# Patient Record
Sex: MALE | Race: BLACK OR AFRICAN AMERICAN | Employment: OTHER | URBAN - METROPOLITAN AREA
[De-identification: names, ages, dates, MRNs, and addresses within clinical notes are randomized per-mention and may not be internally consistent; named-entity substitution may affect disease eponyms.]

---

## 2024-05-06 DIAGNOSIS — Z01.818 OTHER SPECIFIED PRE-OPERATIVE EXAMINATION: Primary | ICD-10-CM

## 2024-05-06 DIAGNOSIS — N18.6 END STAGE RENAL DISEASE: ICD-10-CM

## 2024-05-06 DIAGNOSIS — N18.4 CHRONIC KIDNEY DISEASE, STAGE IV (SEVERE): Primary | ICD-10-CM

## 2024-06-06 RX ORDER — CARVEDILOL 25 MG/1
25 TABLET ORAL 2 TIMES DAILY
COMMUNITY
Start: 2024-02-07

## 2024-06-06 RX ORDER — CALCITRIOL 0.25 UG/1
0.25 CAPSULE ORAL
COMMUNITY
Start: 2024-02-06 | End: 2024-06-18

## 2024-06-06 RX ORDER — FUROSEMIDE 40 MG/1
TABLET ORAL
COMMUNITY
Start: 2024-01-09

## 2024-06-06 RX ORDER — DOXAZOSIN 8 MG/1
TABLET ORAL
COMMUNITY
Start: 2024-02-06

## 2024-06-06 RX ORDER — METOLAZONE 2.5 MG/1
TABLET ORAL
COMMUNITY
Start: 2023-12-16

## 2024-06-06 RX ORDER — FLUTICASONE PROPIONATE 50 MCG
2 SPRAY, SUSPENSION (ML) NASAL
COMMUNITY
Start: 2024-02-26 | End: 2024-06-18

## 2024-06-06 RX ORDER — GABAPENTIN 300 MG/1
300 CAPSULE ORAL 3 TIMES DAILY
COMMUNITY
Start: 2024-01-22 | End: 2024-06-18

## 2024-06-06 RX ORDER — AZELASTINE 1 MG/ML
1 SPRAY, METERED NASAL 2 TIMES DAILY
COMMUNITY
Start: 2024-02-27

## 2024-06-06 RX ORDER — TORSEMIDE 20 MG/1
40 TABLET ORAL 2 TIMES DAILY
COMMUNITY
Start: 2024-03-27 | End: 2024-06-18

## 2024-06-06 RX ORDER — ERYTHROPOIETIN 20000 [IU]/ML
INJECTION, SOLUTION INTRAVENOUS; SUBCUTANEOUS
COMMUNITY
Start: 2024-04-24 | End: 2024-06-18

## 2024-06-06 RX ORDER — METHADONE HYDROCHLORIDE 10 MG/1
10 TABLET ORAL 4 TIMES DAILY
COMMUNITY
Start: 2024-01-10 | End: 2024-06-18

## 2024-06-06 RX ORDER — ATORVASTATIN CALCIUM 40 MG/1
40 TABLET, FILM COATED ORAL
COMMUNITY
Start: 2024-02-07

## 2024-06-06 RX ORDER — HYDRALAZINE HYDROCHLORIDE 50 MG/1
50 TABLET, FILM COATED ORAL 2 TIMES DAILY
COMMUNITY
Start: 2024-04-23

## 2024-06-06 RX ORDER — HYDROCODONE BITARTRATE AND ACETAMINOPHEN 10; 325 MG/1; MG/1
1 TABLET ORAL 4 TIMES DAILY PRN
COMMUNITY
End: 2024-06-18

## 2024-06-06 RX ORDER — TIZANIDINE 4 MG/1
4 TABLET ORAL NIGHTLY
COMMUNITY
Start: 2024-02-07 | End: 2024-06-18

## 2024-06-06 RX ORDER — CALCITRIOL 0.5 UG/1
0.5 CAPSULE ORAL
COMMUNITY
Start: 2024-03-27

## 2024-06-17 NOTE — PROGRESS NOTES
"      [facility] - Clinic Note  Mesfin Dasilva MD      Patient Name: Rodrigo Tinsley Jr.                   : 1955     MRN: [id number]   Visit Date: 2024          History Present Illness     Reason for Visit: Dialysis Access Evaluation    Mr. Tinsley presents to the clinic in need of permanent hemodialysis access creation. He is being referred by her nephrolgoist Dr. Valle. He is not on hemodialysis at this time. Most recent GFR is 14. Vein mapping of his left arm obtained today as he is right handed. He denies pacemaker/ICD or history of mediport.     He reports history of cardiac stents. He is in need to aortic valve replacement but it has been determined for his to weight unless after access creation and need to initiate hemodialysis. He is able to walk approximately 50 yards on a good day before onset of shortness of breath. He notes he has gained weight due to increased fluid.      REVIEW OF SYSTEMS:  12 point review of systems conducted, negative except as stated in the history of present illness. See HPI for details.        Physical Exam      Vitals:    24 1053 24 1054   BP: (!) 160/66 (!) 161/72   BP Location: Right arm Left arm   Pulse: 74 72   Weight: 122.5 kg (270 lb)    Height: 5' 7" (1.702 m)         General: well-nourished, no acute distress, and healthy appearing, ambulating normally, alert, pleasant, conversant, and oriented  Neurologic: cranial nerves are grossly intact, no neurologic deficits, no motor deficits, and no sensory deficits  HEENT: grossly normal and no scleral icterus  Neck/Chest: normal  and soft without lymphadenopathy  Respiratory: breathing easily and without respiratory distress  Abdomen: normal and soft  Cardiology: regular rate and rhythm    Upper Extremity Arterial Exam:   Right - radial is palpable  Left - radial is palpable    Dialysis Access:  none    Musculoskeletal:   Upper Extremity: normal bilateral hand function, right hand is warm and " left hand is warm  Lower Extremity: no edema present to bilateral lower extremities              Assessment and Plan     Mr. Tinsley is a 69 y.o. with chronic kidney disease who needs permanent access creation. He is not currently on dialysis, however most recent GFR is 14. Vein mapping obtained today demonstrates anatomy suitable for a LEFT ARM ACCESS CREATION- LIKELY BRACHIOCEPHALIC FISTULA CREATION VERSUS BASILIC TRANSPOSITION.  We discussed the risks and benefits of surgery at length including the risks of bleeding, infection, failure of access to mature, need for further procedures, neurovascular injury, and/or steal syndrome. He wishes to proceed.   Has known cardiac issues and will request records/clearance from cardiologist.  On chronic opiates - no new pain script sent.         1. Chronic kidney disease, stage IV (severe)  - Ambulatory referral/consult to Vascular Surgery  - Basic Metabolic Panel; Future  - CBC Auto Differential; Future  - EKG 12-lead; Future  - X-Ray Chest PA And Lateral Pre-OP; Future           Imaging Obtained/Reviewed   Study: left upper extremity vein mapping  Date:   6/18/24           Medical History     Past Medical History:   Diagnosis Date    Anemia     Arthritis     Asymptomatic PVD (peripheral vascular disease)     stenting of BLE - Dr. Drew    CAD (coronary artery disease)     CKD (chronic kidney disease), stage III     Diabetes     GERD (gastroesophageal reflux disease)     HTN (hypertension)     Hyperkalemia     Lymphedema     Morbid obesity     STAN (obstructive sleep apnea)     on CPAP    Pneumonia 2023    Sinusitis      Past Surgical History:   Procedure Laterality Date    DISC REMOVAL      C5 and C7    FEMORAL ARTERY STENT  2013    GASTRIC BYPASS      PERCUTANEOUS CORONARY ARTERY INTERVENTION      REVISION OF KNEE ARTHROPLASTY Left     SINUS SURGERY      TOTAL KNEE ARTHROPLASTY Left      Family History   Problem Relation Name Age of Onset    Hypertension Mother       Diabetes Mother      Hypertension Father      Diabetes Father       Social History     Socioeconomic History    Marital status: Single   Tobacco Use    Smoking status: Former     Current packs/day: 0.00     Types: Cigarettes     Quit date:      Years since quitting: 15.4    Smokeless tobacco: Former     Types: Chew     Quit date: 2009     Current Outpatient Medications   Medication Instructions    aspirin 81 MG Chew 1 tablet, Oral, Daily    atorvastatin (LIPITOR) 40 mg, Oral    azelastine (ASTELIN) 137 mcg (0.1 %) nasal spray 1 spray, 2 times daily    calcitRIOL (ROCALTROL) 0.5 mcg, Oral    carvediloL (COREG) 25 mg, Oral, 2 times daily    doxazosin (CARDURA) 8 MG Tab Oral    furosemide (LASIX) 40 MG tablet SMARTSI Tablet(s) By Mouth Morning-Evening PRN    hydrALAZINE (APRESOLINE) 50 mg, Oral, 2 times daily    metOLazone (ZAROXOLYN) 2.5 MG tablet Oral     Review of patient's allergies indicates:   Allergen Reactions    Nifedipine Swelling    Verapamil Other (See Comments)     Swelling of knee       Patient Care Team:  Thor London MD (Inactive) as PCP - General (Family Medicine)  Keily Valle MD (Nephrology)  Earle Orlando MD as Consulting Physician (Cardiothoracic Surgery)  Barrie Drew MD (Cardiology)      No follow-ups on file. In addition to their scheduled follow up, the patient has also been instructed to follow up on as needed basis.     No future appointments.

## 2024-06-18 ENCOUNTER — OFFICE VISIT (OUTPATIENT)
Dept: VASCULAR SURGERY | Facility: CLINIC | Age: 69
End: 2024-06-18
Payer: MEDICARE

## 2024-06-18 VITALS
HEIGHT: 67 IN | HEART RATE: 72 BPM | WEIGHT: 270 LBS | DIASTOLIC BLOOD PRESSURE: 72 MMHG | SYSTOLIC BLOOD PRESSURE: 161 MMHG | BODY MASS INDEX: 42.38 KG/M2

## 2024-06-18 DIAGNOSIS — N18.4 CHRONIC KIDNEY DISEASE, STAGE IV (SEVERE): ICD-10-CM

## 2024-06-18 DIAGNOSIS — N18.4 CHRONIC KIDNEY DISEASE, STAGE IV (SEVERE): Primary | ICD-10-CM

## 2024-06-18 PROCEDURE — 99204 OFFICE O/P NEW MOD 45 MIN: CPT | Mod: ,,, | Performed by: SPECIALIST

## 2024-06-18 RX ORDER — NAPROXEN SODIUM 220 MG/1
1 TABLET, FILM COATED ORAL DAILY
COMMUNITY

## 2024-06-18 RX ORDER — HYDROCODONE BITARTRATE AND ACETAMINOPHEN 7.5; 325 MG/1; MG/1
1 TABLET ORAL EVERY 6 HOURS PRN
Qty: 28 TABLET | Refills: 0 | Status: CANCELLED | OUTPATIENT
Start: 2024-06-18

## 2024-06-18 RX ORDER — SODIUM CHLORIDE 9 MG/ML
INJECTION, SOLUTION INTRAVENOUS CONTINUOUS
OUTPATIENT
Start: 2024-07-01

## 2024-06-18 NOTE — LETTER
[facility]                    Medication Clearance Form    PLEASE PROVIDE ALL INFORMATION      Date : 2024       Please provide us with WRITTEN Medication Clearance:  Mr. Rodrigo Tinsley Jr.    : 1955       Please send most recent clinic note, EKG, echocardiogram, and/or additional cardiac testing.      He will be scheduled for a HEMODIALYSIS ACCESS CREATION  under Regional Nerve Block scheduled on  2024.      PLEASE PROVIDE CARDIAC CLEARANCE TO PROCEED WITH SURGERY.      Thank you for your help in this matter.    Sincerely,        Mesfin Dasilva MD  [facility]  Phone: [phone]  Fax: [phone]

## 2024-06-27 ENCOUNTER — TELEPHONE (OUTPATIENT)
Dept: VASCULAR SURGERY | Facility: CLINIC | Age: 69
End: 2024-06-27
Payer: MEDICARE

## 2024-06-27 NOTE — TELEPHONE ENCOUNTER
Patients potassium level was 5.4 from 6/20/2024 on preop testing for surgery with Dr. Dasilva. Spoke with Dr. Valle's nurse Jennifer who states she will review with Dr. Valle. Patient is CKD.

## 2024-06-28 ENCOUNTER — TELEPHONE (OUTPATIENT)
Dept: VASCULAR SURGERY | Facility: CLINIC | Age: 69
End: 2024-06-28
Payer: MEDICARE

## 2024-06-28 NOTE — TELEPHONE ENCOUNTER
Spoke with William at Dr. Drew's office who states that patient will need to be seen prior to cardiac clearance begin given. Patient is scheduled on 7/15/2024 at 1:15 pm. I rescheduled his surgery to 7/17/2024 to keep him within date of labs and H&P. Patients wife verbalized understanding of all instructions.

## 2024-07-17 ENCOUNTER — TELEPHONE (OUTPATIENT)
Dept: VASCULAR SURGERY | Facility: CLINIC | Age: 69
End: 2024-07-17
Payer: MEDICARE

## 2024-07-17 NOTE — TELEPHONE ENCOUNTER
Late entry (7/16/2024)- Patient had echocardiogram performed on 7/16/2024 based off of those results his cardiologist recommended that he needed a heart cath. Per Dr. Drew patient's L-V function was abnormal. Cancelled patients surgery for 7/17/2024. Patient is scheduled for H&P update.Determination of where or not his H&P update will need to be moved out will be based off of heart cath results.

## 2024-08-05 NOTE — PROGRESS NOTES
"      [facility] - Clinic Note  Mesfin Dasilva MD      Patient Name: Rodrigo Tinsley Jr.                   : 1955     MRN: [id number]   Visit Date: 2024          History Present Illness     Reason for Visit: History and Physical Update    Mr. Tinsley presents to the clinic for history and physical update in preparation for left brachiocephalic fistula. Surgery was postponed due to need for cardiac clearance. He has been cleared to proceed (records in media). He denies significant changes to his medical history.     He completed his cardiac clearance and did undergo coronary angiography.  No significant concerns were identified.  No other recent issues.       REVIEW OF SYSTEMS:  ROS  12 point review of systems conducted, negative except as stated in the history of present illness. See HPI for details.        Physical Exam      Vitals:    24 0924 24 0925   BP: (!) 166/71 (!) 144/77   BP Location: Left arm Right arm   Pulse: 66 73   Weight: 123.8 kg (273 lb)    Height: 5' 7" (1.702 m)         General: well-nourished, no acute distress, and healthy appearing, ambulating normally, alert, pleasant, conversant, and oriented  Neurologic: cranial nerves are grossly intact, no neurologic deficits, no motor deficits, and no sensory deficits  HEENT: grossly normal and no scleral icterus  Neck/Chest: normal  and soft without lymphadenopathy  Respiratory: breathing easily and without respiratory distress  Abdomen: normal and soft  Cardiology: regular rate and rhythm    Upper Extremity Arterial Exam:   Right - radial is palpable  Left - radial is palpable    Dialysis Access:  none    Musculoskeletal:   Upper Extremity: normal bilateral hand function,right hand is warm and left hand is warm  Lower Extremity: trace edema present to bilateral lower extremities            Assessment and Plan     Mr. Tinsley is a 69 y.o.  with chronic kidney disease who needs permanent access creation. He is not " currently on dialysis, however most recent GFR is 11. Vein mapping obtained 6/18/24 demonstrates anatomy suitable for a LEFT ARM ACCESS CREATION- LIKELY BRACHIOCEPHALIC FISTULA CREATION VERSUS BASILIC TRANSPOSITION.  We reviewed the risks and benefits of surgery at length including the risks of bleeding, infection, failure of access to mature, need for further procedures, neurovascular injury, and/or steal syndrome. He has been evaluated by cardiology and cleared for surgery. He wishes to proceed.        On chronic opiates - no new pain script sent.       1. CKD (chronic kidney disease), stage V  - Basic Metabolic Panel; Future  - CBC Auto Differential; Future  - EKG 12-lead; Future  - X-Ray Chest PA And Lateral Pre-OP; Future           Imaging Obtained/Reviewed   Study: left upper extremity vein mapping  Date:   6/18/24           Medical History     Past Medical History:   Diagnosis Date    Anemia     Arthritis     Asymptomatic PVD (peripheral vascular disease)     stenting of BLE - Dr. Drew    Balance problem     CAD (coronary artery disease)     Chronic kidney disease, stage IV (severe)     Diabetes     GERD (gastroesophageal reflux disease)     HTN (hypertension)     Hyperkalemia     Left knee pain     Lymphedema     Morbid obesity     Obesity     STAN (obstructive sleep apnea)     on CPAP    Personal history of fall     Pneumonia 2023    Shortness of breath     Sinusitis      Past Surgical History:   Procedure Laterality Date    CERVICAL DISC SURGERY      COLONOSCOPY      CORONARY STENT PLACEMENT      DISC REMOVAL      C5 and C7    FEMORAL ARTERY STENT  2013    GASTRIC BYPASS      PERCUTANEOUS CORONARY ARTERY INTERVENTION      REVISION OF KNEE ARTHROPLASTY Left     SINUS SURGERY      TOTAL KNEE ARTHROPLASTY Left      Family History   Problem Relation Name Age of Onset    Hypertension Mother      Diabetes Mother      Hypertension Father      Diabetes Father       Social History     Socioeconomic History     Marital status: Single   Tobacco Use    Smoking status: Former     Current packs/day: 0.00     Types: Cigarettes     Quit date: 2009     Years since quitting: 15.6    Smokeless tobacco: Former     Types: Chew     Quit date: 03/2009   Substance and Sexual Activity    Alcohol use: Never    Drug use: Never     Current Outpatient Medications   Medication Instructions    aspirin 81 MG Chew 1 tablet, Oral, Daily    atorvastatin (LIPITOR) 40 mg, Oral, Daily    azelastine (ASTELIN) 137 mcg (0.1 %) nasal spray 1 spray, 2 times daily    calcitRIOL (ROCALTROL) 0.5 mcg, Oral, Daily    carvediloL (COREG) 25 mg, Oral, 2 times daily    doxazosin (CARDURA) 8 mg, Oral, Every 12 hours    furosemide (LASIX) 80 mg, Oral, Daily    hydrALAZINE (APRESOLINE) 50 mg, Oral, 2 times daily    metOLazone (ZAROXOLYN) 2.5 mg, Oral, As needed (PRN)     Review of patient's allergies indicates:   Allergen Reactions    Nifedipine Swelling    Verapamil Other (See Comments)     Swelling of knee       Patient Care Team:  Thor London MD (Inactive) as PCP - General (Family Medicine)  Keily Valle MD (Nephrology)  Earle Orlando MD as Consulting Physician (Cardiothoracic Surgery)  Barrie Drew MD (Cardiology)  [facility] ([facility])      No follow-ups on file. In addition to their scheduled follow up, the patient has also been instructed to follow up on as needed basis.     No future appointments.

## 2024-08-05 NOTE — H&P (VIEW-ONLY)
"      [facility] - Clinic Note  Mesfin Dasilva MD      Patient Name: Rodrigo Tinsley Jr.                   : 1955     MRN: [id number]   Visit Date: 2024          History Present Illness     Reason for Visit: History and Physical Update    Mr. Tinsley presents to the clinic for history and physical update in preparation for left brachiocephalic fistula. Surgery was postponed due to need for cardiac clearance. He has been cleared to proceed (records in media). He denies significant changes to his medical history.     He completed his cardiac clearance and did undergo coronary angiography.  No significant concerns were identified.  No other recent issues.       REVIEW OF SYSTEMS:  ROS  12 point review of systems conducted, negative except as stated in the history of present illness. See HPI for details.        Physical Exam      Vitals:    24 0924 24 0925   BP: (!) 166/71 (!) 144/77   BP Location: Left arm Right arm   Pulse: 66 73   Weight: 123.8 kg (273 lb)    Height: 5' 7" (1.702 m)         General: well-nourished, no acute distress, and healthy appearing, ambulating normally, alert, pleasant, conversant, and oriented  Neurologic: cranial nerves are grossly intact, no neurologic deficits, no motor deficits, and no sensory deficits  HEENT: grossly normal and no scleral icterus  Neck/Chest: normal  and soft without lymphadenopathy  Respiratory: breathing easily and without respiratory distress  Abdomen: normal and soft  Cardiology: regular rate and rhythm    Upper Extremity Arterial Exam:   Right - radial is palpable  Left - radial is palpable    Dialysis Access:  none    Musculoskeletal:   Upper Extremity: normal bilateral hand function,right hand is warm and left hand is warm  Lower Extremity: trace edema present to bilateral lower extremities            Assessment and Plan     Mr. Tinsley is a 69 y.o.  with chronic kidney disease who needs permanent access creation. He is not " currently on dialysis, however most recent GFR is 11. Vein mapping obtained 6/18/24 demonstrates anatomy suitable for a LEFT ARM ACCESS CREATION- LIKELY BRACHIOCEPHALIC FISTULA CREATION VERSUS BASILIC TRANSPOSITION.  We reviewed the risks and benefits of surgery at length including the risks of bleeding, infection, failure of access to mature, need for further procedures, neurovascular injury, and/or steal syndrome. He has been evaluated by cardiology and cleared for surgery. He wishes to proceed.        On chronic opiates - no new pain script sent.       1. CKD (chronic kidney disease), stage V  - Basic Metabolic Panel; Future  - CBC Auto Differential; Future  - EKG 12-lead; Future  - X-Ray Chest PA And Lateral Pre-OP; Future           Imaging Obtained/Reviewed   Study: left upper extremity vein mapping  Date:   6/18/24           Medical History     Past Medical History:   Diagnosis Date    Anemia     Arthritis     Asymptomatic PVD (peripheral vascular disease)     stenting of BLE - Dr. Drew    Balance problem     CAD (coronary artery disease)     Chronic kidney disease, stage IV (severe)     Diabetes     GERD (gastroesophageal reflux disease)     HTN (hypertension)     Hyperkalemia     Left knee pain     Lymphedema     Morbid obesity     Obesity     STAN (obstructive sleep apnea)     on CPAP    Personal history of fall     Pneumonia 2023    Shortness of breath     Sinusitis      Past Surgical History:   Procedure Laterality Date    CERVICAL DISC SURGERY      COLONOSCOPY      CORONARY STENT PLACEMENT      DISC REMOVAL      C5 and C7    FEMORAL ARTERY STENT  2013    GASTRIC BYPASS      PERCUTANEOUS CORONARY ARTERY INTERVENTION      REVISION OF KNEE ARTHROPLASTY Left     SINUS SURGERY      TOTAL KNEE ARTHROPLASTY Left      Family History   Problem Relation Name Age of Onset    Hypertension Mother      Diabetes Mother      Hypertension Father      Diabetes Father       Social History     Socioeconomic History     Marital status: Single   Tobacco Use    Smoking status: Former     Current packs/day: 0.00     Types: Cigarettes     Quit date: 2009     Years since quitting: 15.6    Smokeless tobacco: Former     Types: Chew     Quit date: 03/2009   Substance and Sexual Activity    Alcohol use: Never    Drug use: Never     Current Outpatient Medications   Medication Instructions    aspirin 81 MG Chew 1 tablet, Oral, Daily    atorvastatin (LIPITOR) 40 mg, Oral, Daily    azelastine (ASTELIN) 137 mcg (0.1 %) nasal spray 1 spray, 2 times daily    calcitRIOL (ROCALTROL) 0.5 mcg, Oral, Daily    carvediloL (COREG) 25 mg, Oral, 2 times daily    doxazosin (CARDURA) 8 mg, Oral, Every 12 hours    furosemide (LASIX) 80 mg, Oral, Daily    hydrALAZINE (APRESOLINE) 50 mg, Oral, 2 times daily    metOLazone (ZAROXOLYN) 2.5 mg, Oral, As needed (PRN)     Review of patient's allergies indicates:   Allergen Reactions    Nifedipine Swelling    Verapamil Other (See Comments)     Swelling of knee       Patient Care Team:  Thor London MD (Inactive) as PCP - General (Family Medicine)  Keily Valle MD (Nephrology)  Earle Orlando MD as Consulting Physician (Cardiothoracic Surgery)  Barrie Drew MD (Cardiology)  [facility] ([facility])      No follow-ups on file. In addition to their scheduled follow up, the patient has also been instructed to follow up on as needed basis.     No future appointments.

## 2024-08-13 ENCOUNTER — OFFICE VISIT (OUTPATIENT)
Dept: VASCULAR SURGERY | Facility: CLINIC | Age: 69
End: 2024-08-13
Payer: MEDICARE

## 2024-08-13 VITALS
DIASTOLIC BLOOD PRESSURE: 77 MMHG | WEIGHT: 273 LBS | SYSTOLIC BLOOD PRESSURE: 144 MMHG | BODY MASS INDEX: 42.85 KG/M2 | HEIGHT: 67 IN | HEART RATE: 73 BPM

## 2024-08-13 DIAGNOSIS — N18.5 CKD (CHRONIC KIDNEY DISEASE), STAGE V: Primary | ICD-10-CM

## 2024-08-13 DIAGNOSIS — N18.6 END STAGE RENAL DISEASE: Primary | ICD-10-CM

## 2024-08-13 PROCEDURE — 99213 OFFICE O/P EST LOW 20 MIN: CPT | Mod: ,,, | Performed by: SPECIALIST

## 2024-08-13 RX ORDER — SODIUM CHLORIDE 9 MG/ML
INJECTION, SOLUTION INTRAVENOUS CONTINUOUS
OUTPATIENT
Start: 2024-08-13

## 2024-08-23 ENCOUNTER — ANESTHESIA EVENT (OUTPATIENT)
Dept: SURGERY | Facility: HOSPITAL | Age: 69
End: 2024-08-23
Payer: MEDICARE

## 2024-08-23 RX ORDER — HYDROCODONE BITARTRATE AND ACETAMINOPHEN 10; 325 MG/1; MG/1
1 TABLET ORAL
COMMUNITY
Start: 2024-07-05

## 2024-08-23 RX ORDER — FLUTICASONE PROPIONATE 50 MCG
1 SPRAY, SUSPENSION (ML) NASAL 2 TIMES DAILY PRN
COMMUNITY

## 2024-08-23 RX ORDER — GABAPENTIN 300 MG/1
1 CAPSULE ORAL 3 TIMES DAILY PRN
COMMUNITY

## 2024-08-23 RX ORDER — PATIROMER 8.4 G/1
16.8 POWDER, FOR SUSPENSION ORAL 2 TIMES DAILY
COMMUNITY
Start: 2024-06-28

## 2024-08-23 NOTE — CLINICAL REVIEW
labs/EKG reviewed. CXR (6/20/24) reviewed. cardiology notes utd. Echo/Angiogram noted. Cardiac Clearance noted. chart review complete. ccv

## 2024-09-03 ENCOUNTER — TELEPHONE (OUTPATIENT)
Dept: VASCULAR SURGERY | Facility: CLINIC | Age: 69
End: 2024-09-03

## 2024-09-04 ENCOUNTER — ANESTHESIA (OUTPATIENT)
Dept: SURGERY | Facility: HOSPITAL | Age: 69
End: 2024-09-04
Payer: MEDICARE

## 2024-09-04 ENCOUNTER — HOSPITAL ENCOUNTER (OUTPATIENT)
Facility: HOSPITAL | Age: 69
Discharge: HOME | End: 2024-09-04
Attending: SPECIALIST | Admitting: SPECIALIST
Payer: MEDICARE

## 2024-09-04 VITALS
HEIGHT: 67 IN | WEIGHT: 268 LBS | OXYGEN SATURATION: 96 % | SYSTOLIC BLOOD PRESSURE: 113 MMHG | HEART RATE: 87 BPM | BODY MASS INDEX: 42.06 KG/M2 | DIASTOLIC BLOOD PRESSURE: 68 MMHG | RESPIRATION RATE: 20 BRPM | TEMPERATURE: 98 F

## 2024-09-04 DIAGNOSIS — N18.6 END STAGE RENAL DISEASE: ICD-10-CM

## 2024-09-04 LAB
ANION GAP SERPL CALC-SCNC: 7 MEQ/L
BUN SERPL-MCNC: 23.6 MG/DL (ref 8.4–25.7)
CALCIUM SERPL-MCNC: 8.3 MG/DL (ref 8.8–10)
CHLORIDE SERPL-SCNC: 97 MMOL/L (ref 98–107)
CO2 SERPL-SCNC: 31 MMOL/L (ref 23–31)
CREAT SERPL-MCNC: 4.08 MG/DL (ref 0.73–1.18)
CREAT/UREA NIT SERPL: 6
GFR SERPLBLD CREATININE-BSD FMLA CKD-EPI: 15 ML/MIN/1.73/M2
GLUCOSE SERPL-MCNC: 89 MG/DL (ref 82–115)
POCT GLUCOSE: 91 MG/DL (ref 70–110)
POTASSIUM SERPL-SCNC: 4.3 MMOL/L (ref 3.5–5.1)
SODIUM SERPL-SCNC: 135 MMOL/L (ref 136–145)

## 2024-09-04 PROCEDURE — 63600175 PHARM REV CODE 636 W HCPCS

## 2024-09-04 PROCEDURE — 63600175 PHARM REV CODE 636 W HCPCS: Performed by: NURSE ANESTHETIST, CERTIFIED REGISTERED

## 2024-09-04 PROCEDURE — 80048 BASIC METABOLIC PNL TOTAL CA: CPT | Performed by: SPECIALIST

## 2024-09-04 PROCEDURE — 63600175 PHARM REV CODE 636 W HCPCS: Performed by: SPECIALIST

## 2024-09-04 PROCEDURE — 37000008 HC ANESTHESIA 1ST 15 MINUTES: Performed by: SPECIALIST

## 2024-09-04 PROCEDURE — 82962 GLUCOSE BLOOD TEST: CPT | Performed by: SPECIALIST

## 2024-09-04 PROCEDURE — 63600175 PHARM REV CODE 636 W HCPCS: Performed by: ANESTHESIOLOGY

## 2024-09-04 PROCEDURE — 25000003 PHARM REV CODE 250: Performed by: SPECIALIST

## 2024-09-04 PROCEDURE — 36000707: Performed by: SPECIALIST

## 2024-09-04 PROCEDURE — 25000003 PHARM REV CODE 250: Performed by: NURSE ANESTHETIST, CERTIFIED REGISTERED

## 2024-09-04 PROCEDURE — 37000009 HC ANESTHESIA EA ADD 15 MINS: Performed by: SPECIALIST

## 2024-09-04 PROCEDURE — 36000706: Performed by: SPECIALIST

## 2024-09-04 PROCEDURE — 36415 COLL VENOUS BLD VENIPUNCTURE: CPT | Performed by: SPECIALIST

## 2024-09-04 PROCEDURE — 71000016 HC POSTOP RECOV ADDL HR: Performed by: SPECIALIST

## 2024-09-04 PROCEDURE — 71000015 HC POSTOP RECOV 1ST HR: Performed by: SPECIALIST

## 2024-09-04 RX ORDER — PHENYLEPHRINE HYDROCHLORIDE 10 MG/ML
INJECTION INTRAVENOUS
Status: DISCONTINUED | OUTPATIENT
Start: 2024-09-04 | End: 2024-09-04

## 2024-09-04 RX ORDER — BUPIVACAINE HYDROCHLORIDE 5 MG/ML
INJECTION, SOLUTION EPIDURAL; INTRACAUDAL
Status: DISCONTINUED | OUTPATIENT
Start: 2024-09-04 | End: 2024-09-04 | Stop reason: HOSPADM

## 2024-09-04 RX ORDER — MIDAZOLAM HYDROCHLORIDE 2 MG/2ML
1 INJECTION, SOLUTION INTRAMUSCULAR; INTRAVENOUS ONCE
Status: DISCONTINUED | OUTPATIENT
Start: 2024-09-04 | End: 2024-09-04

## 2024-09-04 RX ORDER — ONDANSETRON HYDROCHLORIDE 2 MG/ML
INJECTION, SOLUTION INTRAVENOUS
Status: DISCONTINUED | OUTPATIENT
Start: 2024-09-04 | End: 2024-09-04

## 2024-09-04 RX ORDER — SODIUM CHLORIDE 9 MG/ML
INJECTION, SOLUTION INTRAVENOUS CONTINUOUS
Status: DISCONTINUED | OUTPATIENT
Start: 2024-09-04 | End: 2024-09-04 | Stop reason: HOSPADM

## 2024-09-04 RX ORDER — HEPARIN SODIUM 5000 [USP'U]/ML
INJECTION, SOLUTION INTRAVENOUS; SUBCUTANEOUS
Status: DISCONTINUED | OUTPATIENT
Start: 2024-09-04 | End: 2024-09-04 | Stop reason: HOSPADM

## 2024-09-04 RX ORDER — MIDAZOLAM HYDROCHLORIDE 2 MG/2ML
2 INJECTION, SOLUTION INTRAMUSCULAR; INTRAVENOUS ONCE
Status: COMPLETED | OUTPATIENT
Start: 2024-09-04 | End: 2024-09-04

## 2024-09-04 RX ORDER — LIDOCAINE HYDROCHLORIDE 20 MG/ML
INJECTION, SOLUTION EPIDURAL; INFILTRATION; INTRACAUDAL; PERINEURAL
Status: DISCONTINUED | OUTPATIENT
Start: 2024-09-04 | End: 2024-09-04

## 2024-09-04 RX ORDER — MIDAZOLAM HYDROCHLORIDE 2 MG/2ML
2 INJECTION, SOLUTION INTRAMUSCULAR; INTRAVENOUS ONCE
Status: DISCONTINUED | OUTPATIENT
Start: 2024-09-04 | End: 2024-09-04

## 2024-09-04 RX ORDER — PROTAMINE SULFATE 10 MG/ML
INJECTION, SOLUTION INTRAVENOUS
Status: DISCONTINUED | OUTPATIENT
Start: 2024-09-04 | End: 2024-09-04

## 2024-09-04 RX ORDER — HYDROCODONE BITARTRATE AND ACETAMINOPHEN 5; 325 MG/1; MG/1
1 TABLET ORAL EVERY 4 HOURS PRN
Status: DISCONTINUED | OUTPATIENT
Start: 2024-09-04 | End: 2024-09-04 | Stop reason: HOSPADM

## 2024-09-04 RX ORDER — LIDOCAINE HYDROCHLORIDE 10 MG/ML
INJECTION, SOLUTION INFILTRATION; PERINEURAL
Status: DISCONTINUED | OUTPATIENT
Start: 2024-09-04 | End: 2024-09-04 | Stop reason: HOSPADM

## 2024-09-04 RX ORDER — GLYCOPYRROLATE 0.2 MG/ML
INJECTION INTRAMUSCULAR; INTRAVENOUS
Status: DISCONTINUED | OUTPATIENT
Start: 2024-09-04 | End: 2024-09-04

## 2024-09-04 RX ORDER — CEFAZOLIN SODIUM 1 G/3ML
INJECTION, POWDER, FOR SOLUTION INTRAMUSCULAR; INTRAVENOUS
Status: DISCONTINUED | OUTPATIENT
Start: 2024-09-04 | End: 2024-09-04 | Stop reason: HOSPADM

## 2024-09-04 RX ORDER — MIDAZOLAM HYDROCHLORIDE 2 MG/2ML
INJECTION, SOLUTION INTRAMUSCULAR; INTRAVENOUS
Status: COMPLETED
Start: 2024-09-04 | End: 2024-09-04

## 2024-09-04 RX ORDER — LIDOCAINE HYDROCHLORIDE 10 MG/ML
INJECTION, SOLUTION INFILTRATION; PERINEURAL
Status: DISCONTINUED
Start: 2024-09-04 | End: 2024-09-04 | Stop reason: HOSPADM

## 2024-09-04 RX ORDER — CEFAZOLIN SODIUM 2 G/50ML
2 SOLUTION INTRAVENOUS
Status: COMPLETED | OUTPATIENT
Start: 2024-09-04 | End: 2024-09-04

## 2024-09-04 RX ORDER — PROPOFOL 10 MG/ML
VIAL (ML) INTRAVENOUS CONTINUOUS PRN
Status: DISCONTINUED | OUTPATIENT
Start: 2024-09-04 | End: 2024-09-04

## 2024-09-04 RX ORDER — FENTANYL CITRATE 50 UG/ML
INJECTION, SOLUTION INTRAMUSCULAR; INTRAVENOUS
Status: DISCONTINUED | OUTPATIENT
Start: 2024-09-04 | End: 2024-09-04

## 2024-09-04 RX ORDER — HEPARIN SODIUM 1000 [USP'U]/ML
INJECTION, SOLUTION INTRAVENOUS; SUBCUTANEOUS
Status: DISCONTINUED | OUTPATIENT
Start: 2024-09-04 | End: 2024-09-04

## 2024-09-04 RX ADMIN — MEPIVACAINE HYDROCHLORIDE 30 ML: 15 INJECTION, SOLUTION EPIDURAL; INFILTRATION at 09:09

## 2024-09-04 RX ADMIN — ONDANSETRON 4 MG: 2 INJECTION INTRAMUSCULAR; INTRAVENOUS at 11:09

## 2024-09-04 RX ADMIN — SODIUM CHLORIDE: 9 INJECTION, SOLUTION INTRAVENOUS at 09:09

## 2024-09-04 RX ADMIN — HEPARIN SODIUM 4000 UNITS: 1000 INJECTION, SOLUTION INTRAVENOUS; SUBCUTANEOUS at 10:09

## 2024-09-04 RX ADMIN — MEPIVACAINE HYDROCHLORIDE 30 ML: 15 INJECTION, SOLUTION EPIDURAL; INFILTRATION at 08:09

## 2024-09-04 RX ADMIN — FENTANYL CITRATE 25 MCG: 50 INJECTION, SOLUTION INTRAMUSCULAR; INTRAVENOUS at 10:09

## 2024-09-04 RX ADMIN — GLYCOPYRROLATE 0.2 MG: 0.2 INJECTION INTRAMUSCULAR; INTRAVENOUS at 10:09

## 2024-09-04 RX ADMIN — PROTAMINE SULFATE 20 MG: 10 INJECTION, SOLUTION INTRAVENOUS at 10:09

## 2024-09-04 RX ADMIN — LIDOCAINE HYDROCHLORIDE 4 ML: 20 INJECTION, SOLUTION INTRAVENOUS at 09:09

## 2024-09-04 RX ADMIN — CEFAZOLIN SODIUM 2 G: 2 SOLUTION INTRAVENOUS at 09:09

## 2024-09-04 RX ADMIN — PHENYLEPHRINE HYDROCHLORIDE 150 MCG: 10 INJECTION INTRAVENOUS at 10:09

## 2024-09-04 RX ADMIN — PROPOFOL 30 MCG/KG/MIN: 10 INJECTION, EMULSION INTRAVENOUS at 09:09

## 2024-09-04 NOTE — ANESTHESIA POSTPROCEDURE EVALUATION
Anesthesia Post Evaluation    Patient: Rodrigo Tinsley Jr.    Procedure(s) Performed: Procedure(s) (LRB):  CREATION, AV FISTULA (Left)    Final Anesthesia Type: regional      Patient location during evaluation: PACU  Patient participation: Yes- Able to Participate  Level of consciousness: awake and alert  Post-procedure vital signs: reviewed and stable  Pain management: adequate  Airway patency: patent    PONV status at discharge: No PONV  Anesthetic complications: no      Cardiovascular status: blood pressure returned to baseline  Respiratory status: room air  Hydration status: euvolemic  Follow-up not needed.              Vitals Value Taken Time   /68 09/04/24 1215   Temp 36.4 °C (97.5 °F) 09/04/24 1124   Pulse 87 09/04/24 1215   Resp 20 09/04/24 1215   SpO2 96 % 09/04/24 1215         No case tracking events are documented in the log.      Pain/Alexa Score: Pain Rating Prior to Med Admin: 0 (9/4/2024  8:54 AM)  Alexa Score: 10 (9/4/2024 12:15 PM)  Modified Alexa Score: 20 (9/4/2024 12:15 PM)

## 2024-09-04 NOTE — OP NOTE
Surgeon: Mesfin Dasilva MD    Date: 09/04/2024     Diagnosis: Pre-Op Diagnosis Codes:     * End stage renal disease [N18.6]     Procedure:  Left Brachiocephalic fistula creation    History of Presenting Illness: Mr. Tinsley is a 69 y.o. year old male who has end stage renal disease and needs long term hemodialysis access.      Procedure:  The patient was taken to the operating room and placed in a supine position.  The Left arm was prepped and draped in the usual sterile fashion.  Antibiotics were administered and appropriate time-out was performed.  B-mode ultrasound was performed on the extremity.  The upper arm cephalic vein was identified and the brachial artery was identified.  Both appeared appropriate.  A serpentine incision was made just proximal to the antecubital fossa.  The cephalic vein was identified and mobilized.  Ultimately was ligated distally with a 2-0 silk tie.  The vein easily accepted a 4 mm dilator and demonstrated a thrill with heparin injections.  The brachial artery was isolated and controlled.  Heparin was administered and the brachial artery was clamped.  Arteriotomy was made and an end-to-side anastomosis was performed with 6-0 Prolene suture.  There was a good thrill to the fistula and a palpable radial pulse.  Hemostasis was obtained.  Further dissection was performed to avoid tension on the fistula.  The wound was closed with 3-0 Vicryl suture and 4-0 Monocryl suture.  Dermabond was applied.    Complications:  none    Findings: Good thrill and radial artery pulse at completion.    EBL: less than 30ml

## 2024-09-04 NOTE — ANESTHESIA PROCEDURE NOTES
Peripheral Block    Patient location during procedure: OR    Reason for block: primary anesthetic    Diagnosis: ESRD   Start time: 9/4/2024 8:54 AM  Timeout: 9/4/2024 8:54 AM   End time: 9/4/2024 9:08 AM    Staffing  Authorizing Provider: Brian Henao MD  Performing Provider: Brian Henao MD    Staffing  Performed by: Brian Henao MD  Authorized by: Brian Henao MD    Preanesthetic Checklist  Completed: patient identified, IV checked, site marked, risks and benefits discussed, surgical consent, monitors and equipment checked, pre-op evaluation and timeout performed  Peripheral Block  Patient position: supine  Prep: ChloraPrep  Patient monitoring: heart rate, cardiac monitor, continuous pulse ox, continuous capnometry and frequent blood pressure checks  Block type: supraclavicular  Laterality: left  Injection technique: single shot  Needle  Needle type: Stimuplex   Needle gauge: 22 G  Needle length: 3.5 in  Needle localization: anatomical landmarks, ultrasound guidance and nerve stimulator   -ultrasound image captured on disc.  Assessment  Injection assessment: negative aspiration, negative parasthesia and local visualized surrounding nerve  Paresthesia pain: none  Heart rate change: no  Slow fractionated injection: yes    Medications:    Medications: mepivacaine (CARBOCAINE) injection 15 mg/mL (1.5%) - Perineural   30 mL - 9/4/2024 9:00:00 AM    Additional Notes  Patient tolerated procedure well.  No complications

## 2024-09-04 NOTE — DISCHARGE INSTRUCTIONS
NO driving and no alcohol consumption for 24 hours and while taking narcotic pain medications.    -ELEVATE affected extremity as needed to aid in pain and inflammation.    -Keep site clean and dry for 48 hours. OK to shower afterwards. Do not submerge incision under water. Dermabond/steri-strips will fall off on its own time. Do not peel off.    -No heavy lifting with affected extremity. Do not lift objects greater than 10lbs.    -Monitor extremity for good circulation. If you received block, it can last 8-12 hours.    -Monitor incision for signs of infection: redness, swelling, drainage/pus/foul odor, fever, chills.    -Report to your nearest ER and/or notify your provider if you experience any SUDDEN/SEVERE chest/abdominal pain, weakness, trouble breathing, uncontrolled pain or bleeding     BLEEDING: if you experience uncontrollable bleeding, contact your doctor and go to ER.    NAUSEA: due to the anesthesia, you may experience nausea for up to 24 hours. If nausea and vomiting last longer, contact your doctor.     INFECTION:  watch for any signs or symptoms of infection such as chills, fever, redness or drainage at surgical site. Notify your doctor.     PAIN : take your pain medications as directed. If the pain medications are not helping, notify your doctor.

## 2024-09-04 NOTE — ANESTHESIA PREPROCEDURE EVALUATION
09/04/2024  Rodrigo Tinsley Jr. is a 69 y.o., male.      Pre-op Assessment          Review of Systems  Anesthesia Hx:  No problems with previous Anesthesia                Social:  Non-Smoker       Cardiovascular:     Hypertension                                        Pulmonary:      Shortness of breath  Sleep Apnea                Renal/:  Chronic Renal Disease (HD yesterday), ESRD, Dialysis                Hepatic/GI:     GERD             Endocrine:  Diabetes, type 2         Obesity / BMI > 30 (Class 3)      Physical Exam  General: Alert and Oriented    Airway:  Mallampati: II   Mouth Opening: Normal  TM Distance: Normal    Dental:  Edentulous    Chest/Lungs:  Clear to auscultation    Heart:  Rate: Normal  Rhythm: Regular Rhythm        Anesthesia Plan  Type of Anesthesia, risks & benefits discussed:    Anesthesia Type: MAC, Regional  Intra-op Monitoring Plan: Standard ASA Monitors  Post Op Pain Control Plan: multimodal analgesia  Informed Consent: Informed consent signed with the Patient and all parties understand the risks and agree with anesthesia plan.  All questions answered.   ASA Score: 3  Day of Surgery Review of History & Physical: H&P Update referred to the surgeon/provider.    Ready For Surgery From Anesthesia Perspective.     .

## 2024-09-04 NOTE — DISCHARGE SUMMARY
[facility] - Periop Services  Discharge Note  Short Stay    Procedure(s) (LRB):  CREATION, AV FISTULA (Left)      OUTCOME: Patient tolerated treatment/procedure well without complication and is now ready for discharge.    DISPOSITION: Home or Self Care    FINAL DIAGNOSIS:  End stage renal disease    FOLLOWUP: In clinic    DISCHARGE INSTRUCTIONS:    Discharge Procedure Orders   Diet general     Keep surgical extremity elevated     Wound care routine (specify)   Order Comments: Leave dermabond in place until it falls off;  Ok to wash with soap under running water but do not submerge.  Do not use antibiotics ointment.     Call MD for:  temperature >100.4     Call MD for:  redness, tenderness, or signs of infection (pain, swelling, redness, odor or green/yellow discharge around incision site)     Activity as tolerated         Clinical Reference Documents Added to Patient Instructions         Document    GENERAL ANESTHESIA DISCHARGE INSTRUCTIONS (ENGLISH)    NERVE BLOCKS (ENGLISH)    PLACEMENT OF A VASCULAR ACCESS FOR DIALYSIS (ENGLISH)            TIME SPENT ON DISCHARGE: 5 minutes

## 2024-09-04 NOTE — TRANSFER OF CARE
"Anesthesia Transfer of Care Note    Patient: Rodrigo Tinsley Jr.    Procedure(s) Performed: Procedure(s) (LRB):  CREATION, AV FISTULA (Left)    Patient location: OPS    Anesthesia Type: general and regional    Transport from OR: Transported from OR on room air with adequate spontaneous ventilation    Post pain: adequate analgesia    Post assessment: no apparent anesthetic complications    Post vital signs: stable    Level of consciousness: awake, alert and oriented    Nausea/Vomiting: no nausea/vomiting    Complications: none    Transfer of care protocol was followed      Last vitals: Visit Vitals  /63   Pulse 88   Temp 36.4 °C (97.5 °F) (Oral)   Resp 18   Ht 5' 7" (1.702 m)   Wt 121.6 kg (268 lb)   SpO2 98%   BMI 41.97 kg/m²     "

## 2024-09-09 ENCOUNTER — TELEPHONE (OUTPATIENT)
Dept: VASCULAR SURGERY | Facility: CLINIC | Age: 69
End: 2024-09-09
Payer: MEDICARE

## 2024-09-09 NOTE — TELEPHONE ENCOUNTER
Rodrigo Tinsley Jr. is s/p left brachiocephalic fistula creation on 9/4/2024. Attempted to call patient to obtain post operative status, but was unsuccessful.   Left voicemail to call the office back and provided callback number.

## 2024-10-03 RX ORDER — ERYTHROPOIETIN 20000 [IU]/ML
20000 INJECTION, SOLUTION INTRAVENOUS; SUBCUTANEOUS
COMMUNITY
Start: 2024-07-10

## 2024-10-03 RX ORDER — ACYCLOVIR 400 MG/1
TABLET ORAL
COMMUNITY

## 2024-10-03 RX ORDER — HYDROCHLOROTHIAZIDE 12.5 MG/1
TABLET ORAL
COMMUNITY

## 2024-10-14 NOTE — PROGRESS NOTES
"      [facility] Vascular - Clinic Note  Mesfin Dasilva MD      Patient Name: Rodrigo Tinsley Jr.                   : 1955     MRN: [id number]   Visit Date: 10/17/2024          History Present Illness     Reason for Visit: Post-Operative Follow up     Mr. Tinsley presents to the clinic for 6 week follow up s/p left brachiocephalic fistula 24. He denies fevers, drainage from his left upper arm incision, worsening pain or swelling at the site.  He experienced some coolness and numbness to his left hand since surgery. He doesn't feel like this is getting worse and does not have significant issues during the day. He is able to use his hand easily. He states he feels like his symptoms are due to his sleeping position. He is on hemodialysis using a right chest wall catheter. He states he did present to the ED after surgery due to swelling.         REVIEW OF SYSTEMS:  Review of systems conducted, negative except as stated in the history of present illness. See HPI for details.        Physical Exam      Vitals:    10/17/24 0940   BP: (!) 179/63   BP Location: Right arm   Patient Position: Sitting   Pulse: 77   Weight: 121.6 kg (268 lb)   Height: 5' 7" (1.702 m)        General: well-nourished, no acute distress, and healthy appearing, ambulating with a cane, alert, pleasant, conversant, and oriented  Respiratory: breathing easily and without respiratory distress  Abdomen: normal and soft  Cardiology: regular rate and rhythm    Upper Extremity Arterial Exam:   Left - radial is palpable    Dialysis Access:  right chest wall tunneled catheter  left brachiocephalic fistula  Assessment:   Sonosite imaging: hematoma noted about near the incision  Sonosite Imaging:  proximal fistula - 8 mm diameter with 4 mm depth; middle fistula -  7 mm diameter with 11 mm depth; distal fistula - 7 mm diameter with 9 mm depth    Musculoskeletal:   Upper Extremity: normal bilateral hand function,hands are cool bilaterally, mild left " upper extremity swelling  Lower Extremity: no edema present to bilateral lower extremities    Post Operative Incision:   Location: left AC fossa  clean, dry, no drainage, and healed appropriately;  fullness c/w presence of postoperative hematoma.          Assessment and Plan     Mr. Tinsley is a 69 y.o. with end stage renal disease (ESRD) on dialysis who is s/p left brachiocephalic fistula creation on 9/4/24. On exam his fistula has a good thrill. He has some increased left swelling consistent with postoperative hematoma.  Sonosite imaging obtained today shows adequate diameters through the cannulation zone with increased depth to the mid and distal fistula. The access would benefit from surgical revision. I recommend LEFT BRACHIOCEPHALIC FISTULA REVISION WITH SUPERFICIALIZATION. We discussed the risks and benefits of surgery at length including the risks of bleeding, infection, failure of access to mature, neurovascular injury, and/or steal syndrome. He wishes to proceed.          1. Mechanical complication of arteriovenous fistula surgically created, initial encounter  - Basic Metabolic Panel; Future  - CBC Auto Differential; Future  - EKG 12-lead; Future  - X-Ray Chest PA And Lateral Pre-OP; Future    2. End stage renal disease           Imaging Obtained/Reviewed              Medical History     Past Medical History:   Diagnosis Date    Anemia     Arthritis     Asymptomatic PVD (peripheral vascular disease)     stenting of BLE - Dr. Drew    Balance problem     CAD (coronary artery disease)     Chronic kidney disease, stage IV (severe)     Diabetes     End stage renal disease on dialysis     MWF    GERD (gastroesophageal reflux disease)     HLD (hyperlipidemia)     HTN (hypertension)     Hyperkalemia     Leaky heart valve     Left knee pain     Lymphedema     Morbid obesity     Obesity     Obesity     STAN (obstructive sleep apnea)     on CPAP    Personal history of fall     Pneumonia 2023    Shortness of  breath     Sinusitis     SOB (shortness of breath)     Use of cane as ambulatory aid      Past Surgical History:   Procedure Laterality Date    AV FISTULA PLACEMENT Left 9/4/2024    Procedure: CREATION, AV FISTULA;  Surgeon: Mesfin Dasilva MD;  Location: [facility];  Service: Peripheral Vascular;  Laterality: Left;    COLONOSCOPY      CORONARY STENT PLACEMENT      DISC REMOVAL      C5 and C7    ESOPHAGOGASTRODUODENOSCOPY      FEMORAL ARTERY STENT  2013    GASTRIC BYPASS      INSERTION OF DIALYSIS CATHETER      PERCUTANEOUS CORONARY ARTERY INTERVENTION      REVISION OF KNEE ARTHROPLASTY Left     SINUS SURGERY      TOTAL KNEE ARTHROPLASTY Left      Family History   Problem Relation Name Age of Onset    Hypertension Mother      Diabetes Mother      Hypertension Father      Diabetes Father       Social History     Socioeconomic History    Marital status: Single   Tobacco Use    Smoking status: Former     Current packs/day: 0.00     Types: Cigarettes     Quit date: 2009     Years since quitting: 15.8    Smokeless tobacco: Former     Types: Chew     Quit date: 03/2009   Substance and Sexual Activity    Alcohol use: Never    Drug use: Never     Current Outpatient Medications   Medication Instructions    acyclovir (ZOVIRAX) 400 MG tablet     aspirin 81 MG Chew 1 tablet, Oral, Daily    atorvastatin (LIPITOR) 40 mg, Oral, Daily    calcitRIOL (ROCALTROL) 0.5 mcg, Oral, Daily    carvediloL (COREG) 25 mg, Oral, 2 times daily    doxazosin (CARDURA) 4 mg, Every 12 hours    fluticasone propionate (FLONASE) 50 mcg/actuation nasal spray 1 spray, Nasal, 2 times daily PRN    furosemide (LASIX) 40 mg, Oral, 2 times daily    gabapentin (NEURONTIN) 300 MG capsule 1 capsule, Oral, 3 times daily PRN    hydrALAZINE (APRESOLINE) 50 mg, Oral, 2 times daily    hydroCHLOROthiazide (HYDRODIURIL) 12.5 MG Tab TAKE 1 TABLET BY MOUTH EVERY DAY IN THE MORNING FOR 90 DAYS    HYDROcodone-acetaminophen (NORCO)  mg per tablet 1 tablet    methoxy  peg-epoetin beta (MIRCERA INJ) 60 mcg    metOLazone (ZAROXOLYN) 2.5 mg, Oral, As needed (PRN)    PROCRIT 20,000 Units    VELTASSA 16.8 g, Oral, 2 times daily     Review of patient's allergies indicates:   Allergen Reactions    Nifedipine Swelling    Verapamil Other (See Comments)     Swelling of knee       Patient Care Team:  Thor London MD (Inactive) as PCP - General (Family Medicine)  Keily Valle MD (Nephrology)  Earle Orlando MD as Consulting Physician (Cardiothoracic Surgery)  Barrie Drew MD (Cardiology)  [facility] (Dialysis Center)  Mesfin Dasilva MD as Vascular Surgery (Vascular Surgery)      No follow-ups on file. In addition to their scheduled follow up, the patient has also been instructed to follow up on as needed basis.     No future appointments.

## 2024-10-14 NOTE — H&P (VIEW-ONLY)
"      [facility] Vascular - Clinic Note  Mesfin Dasilva MD      Patient Name: Rodrigo Tinsley Jr.                   : 1955     MRN: [id number]   Visit Date: 10/17/2024          History Present Illness     Reason for Visit: Post-Operative Follow up     Mr. Tinsley presents to the clinic for 6 week follow up s/p left brachiocephalic fistula 24. He denies fevers, drainage from his left upper arm incision, worsening pain or swelling at the site.  He experienced some coolness and numbness to his left hand since surgery. He doesn't feel like this is getting worse and does not have significant issues during the day. He is able to use his hand easily. He states he feels like his symptoms are due to his sleeping position. He is on hemodialysis using a right chest wall catheter. He states he did present to the ED after surgery due to swelling.         REVIEW OF SYSTEMS:  Review of systems conducted, negative except as stated in the history of present illness. See HPI for details.        Physical Exam      Vitals:    10/17/24 0940   BP: (!) 179/63   BP Location: Right arm   Patient Position: Sitting   Pulse: 77   Weight: 121.6 kg (268 lb)   Height: 5' 7" (1.702 m)        General: well-nourished, no acute distress, and healthy appearing, ambulating with a cane, alert, pleasant, conversant, and oriented  Respiratory: breathing easily and without respiratory distress  Abdomen: normal and soft  Cardiology: regular rate and rhythm    Upper Extremity Arterial Exam:   Left - radial is palpable    Dialysis Access:  right chest wall tunneled catheter  left brachiocephalic fistula  Assessment:   Sonosite imaging: hematoma noted about near the incision  Sonosite Imaging:  proximal fistula - 8 mm diameter with 4 mm depth; middle fistula -  7 mm diameter with 11 mm depth; distal fistula - 7 mm diameter with 9 mm depth    Musculoskeletal:   Upper Extremity: normal bilateral hand function,hands are cool bilaterally, mild left " upper extremity swelling  Lower Extremity: no edema present to bilateral lower extremities    Post Operative Incision:   Location: left AC fossa  clean, dry, no drainage, and healed appropriately;  fullness c/w presence of postoperative hematoma.          Assessment and Plan     Mr. Tinsley is a 69 y.o. with end stage renal disease (ESRD) on dialysis who is s/p left brachiocephalic fistula creation on 9/4/24. On exam his fistula has a good thrill. He has some increased left swelling consistent with postoperative hematoma.  Sonosite imaging obtained today shows adequate diameters through the cannulation zone with increased depth to the mid and distal fistula. The access would benefit from surgical revision. I recommend LEFT BRACHIOCEPHALIC FISTULA REVISION WITH SUPERFICIALIZATION. We discussed the risks and benefits of surgery at length including the risks of bleeding, infection, failure of access to mature, neurovascular injury, and/or steal syndrome. He wishes to proceed.          1. Mechanical complication of arteriovenous fistula surgically created, initial encounter  - Basic Metabolic Panel; Future  - CBC Auto Differential; Future  - EKG 12-lead; Future  - X-Ray Chest PA And Lateral Pre-OP; Future    2. End stage renal disease           Imaging Obtained/Reviewed              Medical History     Past Medical History:   Diagnosis Date    Anemia     Arthritis     Asymptomatic PVD (peripheral vascular disease)     stenting of BLE - Dr. Drew    Balance problem     CAD (coronary artery disease)     Chronic kidney disease, stage IV (severe)     Diabetes     End stage renal disease on dialysis     MWF    GERD (gastroesophageal reflux disease)     HLD (hyperlipidemia)     HTN (hypertension)     Hyperkalemia     Leaky heart valve     Left knee pain     Lymphedema     Morbid obesity     Obesity     Obesity     STAN (obstructive sleep apnea)     on CPAP    Personal history of fall     Pneumonia 2023    Shortness of  breath     Sinusitis     SOB (shortness of breath)     Use of cane as ambulatory aid      Past Surgical History:   Procedure Laterality Date    AV FISTULA PLACEMENT Left 9/4/2024    Procedure: CREATION, AV FISTULA;  Surgeon: Mesfin Dasilva MD;  Location: [facility];  Service: Peripheral Vascular;  Laterality: Left;    COLONOSCOPY      CORONARY STENT PLACEMENT      DISC REMOVAL      C5 and C7    ESOPHAGOGASTRODUODENOSCOPY      FEMORAL ARTERY STENT  2013    GASTRIC BYPASS      INSERTION OF DIALYSIS CATHETER      PERCUTANEOUS CORONARY ARTERY INTERVENTION      REVISION OF KNEE ARTHROPLASTY Left     SINUS SURGERY      TOTAL KNEE ARTHROPLASTY Left      Family History   Problem Relation Name Age of Onset    Hypertension Mother      Diabetes Mother      Hypertension Father      Diabetes Father       Social History     Socioeconomic History    Marital status: Single   Tobacco Use    Smoking status: Former     Current packs/day: 0.00     Types: Cigarettes     Quit date: 2009     Years since quitting: 15.8    Smokeless tobacco: Former     Types: Chew     Quit date: 03/2009   Substance and Sexual Activity    Alcohol use: Never    Drug use: Never     Current Outpatient Medications   Medication Instructions    acyclovir (ZOVIRAX) 400 MG tablet     aspirin 81 MG Chew 1 tablet, Oral, Daily    atorvastatin (LIPITOR) 40 mg, Oral, Daily    calcitRIOL (ROCALTROL) 0.5 mcg, Oral, Daily    carvediloL (COREG) 25 mg, Oral, 2 times daily    doxazosin (CARDURA) 4 mg, Every 12 hours    fluticasone propionate (FLONASE) 50 mcg/actuation nasal spray 1 spray, Nasal, 2 times daily PRN    furosemide (LASIX) 40 mg, Oral, 2 times daily    gabapentin (NEURONTIN) 300 MG capsule 1 capsule, Oral, 3 times daily PRN    hydrALAZINE (APRESOLINE) 50 mg, Oral, 2 times daily    hydroCHLOROthiazide (HYDRODIURIL) 12.5 MG Tab TAKE 1 TABLET BY MOUTH EVERY DAY IN THE MORNING FOR 90 DAYS    HYDROcodone-acetaminophen (NORCO)  mg per tablet 1 tablet    methoxy  peg-epoetin beta (MIRCERA INJ) 60 mcg    metOLazone (ZAROXOLYN) 2.5 mg, Oral, As needed (PRN)    PROCRIT 20,000 Units    VELTASSA 16.8 g, Oral, 2 times daily     Review of patient's allergies indicates:   Allergen Reactions    Nifedipine Swelling    Verapamil Other (See Comments)     Swelling of knee       Patient Care Team:  Thor London MD (Inactive) as PCP - General (Family Medicine)  Keily Valle MD (Nephrology)  Earle Orlando MD as Consulting Physician (Cardiothoracic Surgery)  Barrie Drew MD (Cardiology)  [facility] (Dialysis Center)  Mesfin Dasilva MD as Vascular Surgery (Vascular Surgery)      No follow-ups on file. In addition to their scheduled follow up, the patient has also been instructed to follow up on as needed basis.     No future appointments.

## 2024-10-17 ENCOUNTER — OFFICE VISIT (OUTPATIENT)
Dept: VASCULAR SURGERY | Facility: CLINIC | Age: 69
End: 2024-10-17
Payer: MEDICARE

## 2024-10-17 VITALS
SYSTOLIC BLOOD PRESSURE: 179 MMHG | WEIGHT: 268 LBS | BODY MASS INDEX: 42.06 KG/M2 | HEART RATE: 77 BPM | HEIGHT: 67 IN | DIASTOLIC BLOOD PRESSURE: 63 MMHG

## 2024-10-17 DIAGNOSIS — T82.590A MECHANICAL COMPLICATION OF ARTERIOVENOUS FISTULA SURGICALLY CREATED, INITIAL ENCOUNTER: Primary | ICD-10-CM

## 2024-10-17 DIAGNOSIS — T82.590A MECHANICAL COMPLICATION OF ARTERIOVENOUS FISTULA SURGICALLY CREATED: ICD-10-CM

## 2024-10-17 DIAGNOSIS — N18.6 END STAGE RENAL DISEASE: ICD-10-CM

## 2024-10-17 RX ORDER — HYDROCODONE BITARTRATE AND ACETAMINOPHEN 7.5; 325 MG/1; MG/1
1 TABLET ORAL EVERY 6 HOURS PRN
Qty: 28 TABLET | Refills: 0 | Status: CANCELLED | OUTPATIENT
Start: 2024-10-17

## 2024-10-17 RX ORDER — SODIUM CHLORIDE 9 MG/ML
INJECTION, SOLUTION INTRAVENOUS CONTINUOUS
OUTPATIENT
Start: 2024-10-17

## 2024-10-21 ENCOUNTER — ANESTHESIA EVENT (OUTPATIENT)
Dept: SURGERY | Facility: HOSPITAL | Age: 69
End: 2024-10-21
Payer: MEDICARE

## 2024-10-30 NOTE — CLINICAL REVIEW
Pt states going to [facility] to complete preop testing. KG; Awaiting preop testing. cardiology notes utd. Echo noted. Cardiac Clearance provided. ccv// outside labs reviewed. crc sd

## 2024-11-01 ENCOUNTER — TELEPHONE (OUTPATIENT)
Dept: VASCULAR SURGERY | Facility: CLINIC | Age: 69
End: 2024-11-01
Payer: MEDICARE

## 2024-11-04 ENCOUNTER — ANESTHESIA (OUTPATIENT)
Dept: SURGERY | Facility: HOSPITAL | Age: 69
End: 2024-11-04
Payer: MEDICARE

## 2024-11-04 ENCOUNTER — HOSPITAL ENCOUNTER (OUTPATIENT)
Facility: HOSPITAL | Age: 69
Discharge: HOME | End: 2024-11-04
Attending: SPECIALIST | Admitting: SPECIALIST
Payer: MEDICARE

## 2024-11-04 VITALS
DIASTOLIC BLOOD PRESSURE: 70 MMHG | BODY MASS INDEX: 41.84 KG/M2 | TEMPERATURE: 97 F | HEIGHT: 67 IN | RESPIRATION RATE: 14 BRPM | HEART RATE: 80 BPM | SYSTOLIC BLOOD PRESSURE: 131 MMHG | OXYGEN SATURATION: 95 % | WEIGHT: 266.56 LBS

## 2024-11-04 DIAGNOSIS — N18.6 END STAGE RENAL DISEASE: Primary | ICD-10-CM

## 2024-11-04 DIAGNOSIS — T82.590A MECHANICAL COMPLICATION OF ARTERIOVENOUS FISTULA SURGICALLY CREATED, INITIAL ENCOUNTER: ICD-10-CM

## 2024-11-04 DIAGNOSIS — T82.590A MECHANICAL COMPLICATION OF ARTERIOVENOUS FISTULA SURGICALLY CREATED: ICD-10-CM

## 2024-11-04 LAB
ANION GAP SERPL CALC-SCNC: 14 MEQ/L
BUN SERPL-MCNC: 57 MG/DL (ref 8.4–25.7)
CALCIUM SERPL-MCNC: 8.2 MG/DL (ref 8.8–10)
CHLORIDE SERPL-SCNC: 103 MMOL/L (ref 98–107)
CO2 SERPL-SCNC: 23 MMOL/L (ref 23–31)
CREAT SERPL-MCNC: 6.19 MG/DL (ref 0.72–1.25)
CREAT/UREA NIT SERPL: 9
GFR SERPLBLD CREATININE-BSD FMLA CKD-EPI: 9 ML/MIN/1.73/M2
GLUCOSE SERPL-MCNC: 95 MG/DL (ref 82–115)
POCT GLUCOSE: 100 MG/DL (ref 70–110)
POTASSIUM SERPL-SCNC: 5.1 MMOL/L (ref 3.5–5.1)
SODIUM SERPL-SCNC: 140 MMOL/L (ref 136–145)

## 2024-11-04 PROCEDURE — 80048 BASIC METABOLIC PNL TOTAL CA: CPT | Performed by: SPECIALIST

## 2024-11-04 PROCEDURE — 36000706: Performed by: SPECIALIST

## 2024-11-04 PROCEDURE — 37000008 HC ANESTHESIA 1ST 15 MINUTES: Performed by: SPECIALIST

## 2024-11-04 PROCEDURE — 36000707: Performed by: SPECIALIST

## 2024-11-04 PROCEDURE — 36832 AV FISTULA REVISION OPEN: CPT | Mod: 78,AS,LT, | Performed by: PHYSICIAN ASSISTANT

## 2024-11-04 PROCEDURE — 63600175 PHARM REV CODE 636 W HCPCS: Performed by: NURSE ANESTHETIST, CERTIFIED REGISTERED

## 2024-11-04 PROCEDURE — 37000009 HC ANESTHESIA EA ADD 15 MINS: Performed by: SPECIALIST

## 2024-11-04 PROCEDURE — 76942 ECHO GUIDE FOR BIOPSY: CPT | Performed by: ANESTHESIOLOGY

## 2024-11-04 PROCEDURE — 71000016 HC POSTOP RECOV ADDL HR: Performed by: SPECIALIST

## 2024-11-04 PROCEDURE — 36415 COLL VENOUS BLD VENIPUNCTURE: CPT | Performed by: SPECIALIST

## 2024-11-04 PROCEDURE — 63600175 PHARM REV CODE 636 W HCPCS: Performed by: SPECIALIST

## 2024-11-04 PROCEDURE — 36832 AV FISTULA REVISION OPEN: CPT | Mod: 78,LT,, | Performed by: SPECIALIST

## 2024-11-04 PROCEDURE — 63600175 PHARM REV CODE 636 W HCPCS: Performed by: ANESTHESIOLOGY

## 2024-11-04 PROCEDURE — 71000015 HC POSTOP RECOV 1ST HR: Performed by: SPECIALIST

## 2024-11-04 PROCEDURE — 25000003 PHARM REV CODE 250: Performed by: SPECIALIST

## 2024-11-04 RX ORDER — MEPERIDINE HYDROCHLORIDE 25 MG/ML
6.25 INJECTION INTRAMUSCULAR; INTRAVENOUS; SUBCUTANEOUS ONCE AS NEEDED
OUTPATIENT
Start: 2024-11-04 | End: 2024-11-05

## 2024-11-04 RX ORDER — CEFAZOLIN SODIUM 1 G/3ML
INJECTION, POWDER, FOR SOLUTION INTRAMUSCULAR; INTRAVENOUS
Status: DISCONTINUED | OUTPATIENT
Start: 2024-11-04 | End: 2024-11-04 | Stop reason: HOSPADM

## 2024-11-04 RX ORDER — HALOPERIDOL 5 MG/ML
0.5 INJECTION INTRAMUSCULAR EVERY 10 MIN PRN
OUTPATIENT
Start: 2024-11-04

## 2024-11-04 RX ORDER — PROTAMINE SULFATE 10 MG/ML
INJECTION, SOLUTION INTRAVENOUS
Status: DISCONTINUED | OUTPATIENT
Start: 2024-11-04 | End: 2024-11-04

## 2024-11-04 RX ORDER — HEPARIN SODIUM 1000 [USP'U]/ML
INJECTION, SOLUTION INTRAVENOUS; SUBCUTANEOUS
Status: DISCONTINUED | OUTPATIENT
Start: 2024-11-04 | End: 2024-11-04

## 2024-11-04 RX ORDER — ROPIVACAINE HYDROCHLORIDE 5 MG/ML
30 INJECTION, SOLUTION EPIDURAL; INFILTRATION; PERINEURAL ONCE
Status: COMPLETED | OUTPATIENT
Start: 2024-11-04 | End: 2024-11-04

## 2024-11-04 RX ORDER — SODIUM CHLORIDE 9 MG/ML
INJECTION, SOLUTION INTRAVENOUS CONTINUOUS
Status: DISCONTINUED | OUTPATIENT
Start: 2024-11-04 | End: 2024-11-04 | Stop reason: HOSPADM

## 2024-11-04 RX ORDER — HYDROCODONE BITARTRATE AND ACETAMINOPHEN 5; 325 MG/1; MG/1
1 TABLET ORAL
OUTPATIENT
Start: 2024-11-04

## 2024-11-04 RX ORDER — ROPIVACAINE HYDROCHLORIDE 5 MG/ML
INJECTION, SOLUTION EPIDURAL; INFILTRATION; PERINEURAL
Status: COMPLETED | OUTPATIENT
Start: 2024-11-04 | End: 2024-11-04

## 2024-11-04 RX ORDER — MIDAZOLAM HYDROCHLORIDE 2 MG/2ML
.5-4 INJECTION, SOLUTION INTRAMUSCULAR; INTRAVENOUS
Status: DISCONTINUED | OUTPATIENT
Start: 2024-11-04 | End: 2024-11-04 | Stop reason: HOSPADM

## 2024-11-04 RX ORDER — HYDROMORPHONE HYDROCHLORIDE 2 MG/ML
0.4 INJECTION, SOLUTION INTRAMUSCULAR; INTRAVENOUS; SUBCUTANEOUS EVERY 5 MIN PRN
OUTPATIENT
Start: 2024-11-04

## 2024-11-04 RX ORDER — LIDOCAINE HYDROCHLORIDE 20 MG/ML
INJECTION, SOLUTION EPIDURAL; INFILTRATION; INTRACAUDAL; PERINEURAL
Status: DISCONTINUED | OUTPATIENT
Start: 2024-11-04 | End: 2024-11-04

## 2024-11-04 RX ORDER — SODIUM CHLORIDE, SODIUM GLUCONATE, SODIUM ACETATE, POTASSIUM CHLORIDE AND MAGNESIUM CHLORIDE 30; 37; 368; 526; 502 MG/100ML; MG/100ML; MG/100ML; MG/100ML; MG/100ML
INJECTION, SOLUTION INTRAVENOUS CONTINUOUS
Status: DISCONTINUED | OUTPATIENT
Start: 2024-11-04 | End: 2024-11-04 | Stop reason: HOSPADM

## 2024-11-04 RX ORDER — PROPOFOL 10 MG/ML
VIAL (ML) INTRAVENOUS CONTINUOUS PRN
Status: DISCONTINUED | OUTPATIENT
Start: 2024-11-04 | End: 2024-11-04

## 2024-11-04 RX ORDER — HEPARIN SODIUM 5000 [USP'U]/ML
INJECTION, SOLUTION INTRAVENOUS; SUBCUTANEOUS
Status: DISCONTINUED | OUTPATIENT
Start: 2024-11-04 | End: 2024-11-04 | Stop reason: HOSPADM

## 2024-11-04 RX ORDER — HYDROCODONE BITARTRATE AND ACETAMINOPHEN 5; 325 MG/1; MG/1
1 TABLET ORAL EVERY 4 HOURS PRN
Status: CANCELLED | OUTPATIENT
Start: 2024-11-04

## 2024-11-04 RX ORDER — ACETAMINOPHEN 500 MG
1000 TABLET ORAL
Status: DISCONTINUED | OUTPATIENT
Start: 2024-11-04 | End: 2024-11-04 | Stop reason: HOSPADM

## 2024-11-04 RX ORDER — SODIUM CHLORIDE, SODIUM LACTATE, POTASSIUM CHLORIDE, CALCIUM CHLORIDE 600; 310; 30; 20 MG/100ML; MG/100ML; MG/100ML; MG/100ML
INJECTION, SOLUTION INTRAVENOUS CONTINUOUS
Status: DISCONTINUED | OUTPATIENT
Start: 2024-11-04 | End: 2024-11-04 | Stop reason: HOSPADM

## 2024-11-04 RX ORDER — CEFAZOLIN SODIUM 1 G/3ML
2 INJECTION, POWDER, FOR SOLUTION INTRAMUSCULAR; INTRAVENOUS
Status: COMPLETED | OUTPATIENT
Start: 2024-11-04 | End: 2024-11-04

## 2024-11-04 RX ORDER — ONDANSETRON 4 MG/1
4 TABLET, ORALLY DISINTEGRATING ORAL
Status: DISCONTINUED | OUTPATIENT
Start: 2024-11-04 | End: 2024-11-04 | Stop reason: HOSPADM

## 2024-11-04 RX ORDER — PROCHLORPERAZINE EDISYLATE 5 MG/ML
5 INJECTION INTRAMUSCULAR; INTRAVENOUS EVERY 30 MIN PRN
OUTPATIENT
Start: 2024-11-04

## 2024-11-04 RX ORDER — TRAMADOL HYDROCHLORIDE 50 MG/1
50 TABLET ORAL
Status: DISCONTINUED | OUTPATIENT
Start: 2024-11-04 | End: 2024-11-04 | Stop reason: HOSPADM

## 2024-11-04 RX ADMIN — CEFAZOLIN 2 G: 330 INJECTION, POWDER, FOR SOLUTION INTRAMUSCULAR; INTRAVENOUS at 09:11

## 2024-11-04 RX ADMIN — PROTAMINE SULFATE 20 MG: 10 INJECTION, SOLUTION INTRAVENOUS at 11:11

## 2024-11-04 RX ADMIN — HEPARIN SODIUM 4000 UNITS: 1000 INJECTION, SOLUTION INTRAVENOUS; SUBCUTANEOUS at 10:11

## 2024-11-04 RX ADMIN — ROPIVACAINE HYDROCHLORIDE 30 ML: 5 INJECTION, SOLUTION EPIDURAL; INFILTRATION; PERINEURAL at 08:11

## 2024-11-04 RX ADMIN — PROPOFOL 50 MCG/KG/MIN: 10 INJECTION, EMULSION INTRAVENOUS at 09:11

## 2024-11-04 RX ADMIN — PROPOFOL 50 MG: 10 INJECTION, EMULSION INTRAVENOUS at 09:11

## 2024-11-04 RX ADMIN — LIDOCAINE HYDROCHLORIDE 80 MG: 20 INJECTION, SOLUTION INTRAVENOUS at 09:11

## 2024-11-04 RX ADMIN — SODIUM CHLORIDE: 9 INJECTION, SOLUTION INTRAVENOUS at 11:11

## 2024-11-04 RX ADMIN — PROPOFOL 50 MCG/KG/MIN: 10 INJECTION, EMULSION INTRAVENOUS at 10:11

## 2024-11-04 RX ADMIN — PROPOFOL 30 MG: 10 INJECTION, EMULSION INTRAVENOUS at 10:11

## 2024-11-04 RX ADMIN — MIDAZOLAM HYDROCHLORIDE 2 MG: 1 INJECTION, SOLUTION INTRAMUSCULAR; INTRAVENOUS at 08:11

## 2024-11-04 RX ADMIN — SODIUM CHLORIDE: 9 INJECTION, SOLUTION INTRAVENOUS at 10:11

## 2024-11-04 RX ADMIN — SODIUM CHLORIDE: 9 INJECTION, SOLUTION INTRAVENOUS at 09:11

## 2024-11-04 RX ADMIN — PROPOFOL 50 MCG/KG/MIN: 10 INJECTION, EMULSION INTRAVENOUS at 11:11

## 2024-11-04 NOTE — ANESTHESIA PROCEDURE NOTES
Peripheral Block    Patient location during procedure: pre-op    Reason for block: primary anesthetic    Diagnosis: Left UE AV Fistula/graft revision/creation   Start time: 11/4/2024 8:39 AM  Timeout: 11/4/2024 8:33 AM   End time: 11/4/2024 8:40 AM    Staffing  Authorizing Provider: Elizabeth Carlton MD  Performing Provider: Elizabeth Carlton MD    Staffing  Performed by: Elizabeth Carlton MD  Authorized by: Elizabeth Carlton MD    Preanesthetic Checklist  Completed: patient identified, IV checked, site marked, risks and benefits discussed, surgical consent, monitors and equipment checked, pre-op evaluation and timeout performed  Peripheral Block  Patient position: supine  Prep: ChloraPrep  Patient monitoring: heart rate, cardiac monitor, continuous pulse ox, continuous capnometry and frequent blood pressure checks  Block type: supraclavicular  Laterality: left  Injection technique: single shot  Needle  Needle type: Stimuplex   Needle gauge: 22 G  Needle length: 2 in  Needle localization: anatomical landmarks and ultrasound guidance   -ultrasound image captured on disc.  Assessment  Injection assessment: negative aspiration, negative parasthesia and local visualized surrounding nerve  Paresthesia pain: none  Heart rate change: no  Slow fractionated injection: yes  Pain Tolerance: comfortable throughout block and no complaints  Medications:    Medications: ropivacaine (NAROPIN) injection 0.5% - Perineural   30 mL - 11/4/2024 8:40:00 AM

## 2024-11-04 NOTE — INTERVAL H&P NOTE
The patient has been examined and the H&P has been reviewed:    I concur with the findings and changes have been noted since the H&P was written: Mentions some increased episodes of hand going to sleep.  Poor left radial artery pulse.    Surgery risks, benefits and alternative options discussed and understood by patient/family.          There are no hospital problems to display for this patient.

## 2024-11-04 NOTE — ANESTHESIA POSTPROCEDURE EVALUATION
Anesthesia Post Evaluation    Patient: Rodrigo Tinsley Jr.    Procedure(s) Performed: Procedure(s) (LRB):  REVISION, AV FISTULA (Left)    Final Anesthesia Type: regional      Patient location during evaluation: OPS  Patient participation: Yes- Able to Participate  Level of consciousness: awake and alert  Post-procedure vital signs: reviewed and stable  Pain management: adequate  Airway patency: patent  STAN mitigation strategies: Multimodal analgesia  PONV status at discharge: No PONV  Anesthetic complications: no      Cardiovascular status: blood pressure returned to baseline and hemodynamically stable  Respiratory status: room air  Hydration status: euvolemic                Vitals Value Taken Time   /66 11/04/24 1137   Temp 36 °C (96.8 °F) 11/04/24 1137   Pulse 75 11/04/24 1153   Resp 14 11/04/24 1137   SpO2 99 % 11/04/24 1153         No case tracking events are documented in the log.      Pain/Alexa Score: Alexa Score: 9 (11/4/2024 11:57 AM)

## 2024-11-04 NOTE — TRANSFER OF CARE
"Anesthesia Transfer of Care Note    Patient: Rodrigo Tinsley Jr.    Procedure(s) Performed: Procedure(s) (LRB):  REVISION, AV FISTULA (Left)    Patient location: Other: phase 2 outpatient    Anesthesia Type: regional    Transport from OR: Transported from OR on room air with adequate spontaneous ventilation    Post pain: adequate analgesia    Post vital signs: stable    Level of consciousness: sedated and awake    Nausea/Vomiting: no nausea/vomiting    Complications: none    Transfer of care protocol was followed      Last vitals: Visit Vitals  /66   Pulse 71   Temp 36 °C (96.8 °F)   Resp 14   Ht 5' 7" (1.702 m)   Wt 120.9 kg (266 lb 8.6 oz)   SpO2 98%   BMI 41.75 kg/m²     "

## 2024-11-04 NOTE — ANESTHESIA PREPROCEDURE EVALUATION
11/04/2024  Rodrigo Tinsley Jr. is a 69 y.o., male with   -------------------------------------    Anemia    Arthritis    Asymptomatic PVD (peripheral vascular disease)    stenting of BLE - Dr. Drew    Balance problem    CAD (coronary artery disease)    Chronic kidney disease, stage IV (severe)    Diabetes    End stage renal disease on dialysis    MWF    GERD (gastroesophageal reflux disease)    HLD (hyperlipidemia)    HTN (hypertension)    Hyperkalemia    Leaky heart valve    Left knee pain    Lymphedema    Morbid obesity    Obesity    STAN (obstructive sleep apnea)    on CPAP    Personal history of fall    Pneumonia    Shortness of breath    Sinusitis    SOB (shortness of breath)    Use of cane as ambulatory aid       And   ----------------------------    Av fistula placement    Procedure: CREATION, AV FISTULA;  Surgeon: Mesfin Dasilva MD;  Location: [facility];  Service: Peripheral Vascular;  Laterality: Left;    Colonoscopy    Coronary stent placement    Disc removal    C5 and C7    Esophagogastroduodenoscopy    Femoral artery stent    Gastric bypass    Insertion of dialysis catheter    Percutaneous coronary artery intervention    Revision of knee arthroplasty    Sinus surgery    Total knee arthroplasty       Presents today for left AV fistula revision   K+ today is 5.1    Creation was on 09/04/24 under regional anesthesia and sedation         Pre-op Assessment    I have reviewed the NPO Status.      Review of Systems  Cardiovascular:     Hypertension   CAD                       Shortness of Breath    Coronary Artery Disease:                            Hypertension         Pulmonary:  Pneumonia    Shortness of breath  Sleep Apnea     Obstructive Sleep Apnea (STAN).       Pulmonary Infection:  Pneumonia.     Renal/:  Chronic Renal Disease        Kidney Function/Disease             Hepatic/GI:     GERD          Gerd          Endocrine:  Diabetes    Diabetes                          Physical Exam  General: Cooperative, Alert, Oriented and Flat Affect    Airway:  Mallampati: III   Mouth Opening: Normal  TM Distance: Normal  Tongue: Normal  Neck ROM: Normal ROM  Neck: Girth Increased  Thick neck   Dental:  Edentulous    Chest/Lungs:  Clear to auscultation, Normal Respiratory Rate    Heart:  Rate: Normal  Rhythm: Regular Rhythm        Anesthesia Plan  Type of Anesthesia, risks & benefits discussed:    Anesthesia Type: Regional  Intra-op Monitoring Plan: Standard ASA Monitors  Post Op Pain Control Plan: multimodal analgesia, peripheral nerve block and IV/PO Opioids PRN  Induction:  IV  Informed Consent: Informed consent signed with the Patient and all parties understand the risks and agree with anesthesia plan.  All questions answered.   ASA Score: 4  Day of Surgery Review of History & Physical: H&P Update referred to the surgeon/provider.  Anesthesia Plan Notes: Premedication: Midazolam  Regional: Supraclavicular vs interscalene nerve block for surgical anesthesia - Ropiv 0.5% 30mls  Special Technique: Preemptive analgesia with zofran, acetaminophen and tramadol  Nasal Cannula vs O2 via facemask- consider Supernova Nasal CPAP for obese or STAN patients  PONV prophylaxis  Pt may go directly to Phase 2 if criteria met    Ready For Surgery From Anesthesia Perspective.     .

## 2024-11-04 NOTE — OP NOTE
[facility] - Periop Services  General Surgery  Operative Note    SUMMARY     Date of Procedure: 11/4/2024     Procedure: Revision of left arm brachiocephalic fistula with superficialization, branch ligation and segmental resection    Surgeons and Role:     * Mesfin Dasilva MD - Primary    Assisting Surgeon: Joel PARRY    Pre-Operative Diagnosis: Mechanical complication of arteriovenous fistula surgically created, initial encounter [T82.590A]    Post-Operative Diagnosis: Post-Op Diagnosis Codes:     * Mechanical complication of arteriovenous fistula surgically created, initial encounter [T82.590A]    Anesthesia: Regional    Operative Findings (including complications, if any):  Successful superficialization of the fistula.  Multiple side branches were ligated.  There was significant tortuosity after mobilization of the fistula.  We did resect a short segment of the mid fistula to eliminate the redundancy.  The did this did result in a more linear configuration for the fistula.  There was some proximal pulsatility and a thrill just distal to the apex of our incision in the proximal upper arm at completion.     Description of Technical Procedures:  Mr. Tinsley was taken to the operating room and placed in a supine position where his left arm was prepped and draped in the usual sterile fashion.  Then appropriate time-out was performed.  B-mode ultrasound was utilized to evaluate the course of the fistula in the left upper arm.  Dissection was performed down to the vessel in the vessel wall as exposed throughout our incision.  Our incision length was a proximally 14-16 cm in length.  After mobilizing the fistula we did ligate the side branches with 3-0 silk sutures and some surgical clips.  There was significant redundancy of the fistula at this point.  Heparinization was performed clamps were placed we resected a segment of the fistula in its midportion and then reanastomosed beveled edges in  an end-to-end fashion with interrupted 6 0 Prolene sutures.  We restored flow through the system there was some pulsatility with a distal thrill just beyond the apex of our incision site in the proximal upper arm.  There was good hemostasis.  3-0 Vicryl suture was utilized to reapproximate the deeper tissues.  4-0 Monocryl was utilized to approximate the skin.  Dermabond Prineo dressing was placed.    Joel Martin expertly assisted through the case.  She assisted with vessel exposure and mobilization.  She also assisted me as we performed the anastomosis.    Significant Surgical Tasks Conducted by the Assistant(s), if Applicable: none    Estimated Blood Loss (EBL): less than 50ml           Implants: * No implants in log *    Specimens:   Specimen (24h ago, onward)      None                    Condition: Good    Disposition: PACU - hemodynamically stable.    Attestation: I was present and scrubbed for the entire procedure.

## 2024-11-08 ENCOUNTER — TELEPHONE (OUTPATIENT)
Dept: VASCULAR SURGERY | Facility: CLINIC | Age: 69
End: 2024-11-08
Payer: MEDICARE

## 2024-11-08 NOTE — TELEPHONE ENCOUNTER
Rodrigo Tinsley Jr. is s/p left brachiocephalic fistula revision on 11/4/2024. Attempted to call patient to obtain post operative status, but was unsuccessful.   Left a voicemail to contact the office at their earliest convenience.  Follow up appointment information and office contact were also provided.

## 2024-11-09 NOTE — DISCHARGE SUMMARY
[facility] - Periop Services  Discharge Note  Short Stay    Procedure(s) (LRB):  REVISION, AV FISTULA (Left)      OUTCOME: Patient tolerated treatment/procedure well without complication and is now ready for discharge.    DISPOSITION: Home or Self Care    FINAL DIAGNOSIS:  End stage renal disease    FOLLOWUP: In clinic    DISCHARGE INSTRUCTIONS:    Discharge Procedure Orders   Diet general     Keep surgical extremity elevated     Lifting restrictions     No dressing needed     Wound care routine (specify)   Order Comments: Wound care routine: Keep the incision clean with mild soap and water. Do not submerge, however, showers are okay. Do not peel glue off; it will fall off on it's own within the next 7-10 days.     Call MD for:  temperature >100.4     Call MD for:  severe uncontrolled pain     Call MD for:  redness, tenderness, or signs of infection (pain, swelling, redness, odor or green/yellow discharge around incision site)     Activity as tolerated         Clinical Reference Documents Added to Patient Instructions         Document    ARTERIOVENOUS FISTULA FOR DIALYSIS DISCHARGE INSTRUCTIONS (ENGLISH)    DEALING WITH CONSTIPATION FROM THE DRUGS YOU TAKE (ENGLISH)            TIME SPENT ON DISCHARGE: 5 minutes

## 2024-11-26 NOTE — PROGRESS NOTES
"      [facility] Vascular - Clinic Note  Mesfin Dasilva MD      Patient Name: Rodrigo Tinsley Jr.                   : 1955     MRN: [id number]   Visit Date: 12/3/2024          History Present Illness     Reason for Visit: Post-Operative Follow up     Mr. Tinsley presents to the clinic for 3 week follow up s/p left upper arm fistula revision 24. He denies fevers, drainage from his left upper arm incision, worsening pain or swelling at the site.  He reports some occasional cramping to his left hand since surgery.  He is dialyzing via right chest wall tunneled catheter without issues.       REVIEW OF SYSTEMS:  Review of systems conducted, negative except as stated in the history of present illness. See HPI for details.        Physical Exam      Vitals:    24 1150   BP: (!) 144/63   BP Location: Right arm   Patient Position: Sitting   Pulse: 70   Weight: 119.7 kg (264 lb)   Height: 5' 7" (1.702 m)        General: well-nourished, no acute distress, and healthy appearing, ambulating normally, alert, pleasant, conversant, and oriented  Respiratory: breathing easily and without respiratory distress  Abdomen: normal and soft  Cardiology: regular rate and rhythm    Upper Extremity Arterial Exam:   Left - radial is non-palpable    Dialysis Access:  right chest wall tunneled catheter  left brachiocephalic fistula  Assessment: good thrill; mild proximal pulsatility    Musculoskeletal:   Upper Extremity: normal left hand function, hand is warm and well-perfused  Lower Extremity: no edema present to bilateral lower extremities            Assessment and Plan     Mr. Tinsley is a 69 y.o. with end stage renal failure who is s/p left brachiocephalic fistula revision with segmental resection, superficialization, and branch ligation on 24.  His left upper arm incision is healed well. On exam, fistula has a good thrill with mild pulsatility. Sonosite imaging demonstrates patent fistula without focal stenosis.  " Segmental resection site appears patent without stenosis.     OK to begin cannulation on 12/16/24 starting with 17g needles for 2 weeks, then progress needle size as tolerated. Once access has been functioning reliably for 2 weeks or more, OK to removal right chest wall  tunneled catheter.           1. Mechanical complication of arteriovenous fistula surgically created, initial encounter    2. End stage renal disease           Imaging Obtained/Reviewed              Medical History     Past Medical History:   Diagnosis Date    Anemia     Arthritis     Asymptomatic PVD (peripheral vascular disease)     stenting of BLE - Dr. Drew    Balance problem     CAD (coronary artery disease)     Chronic kidney disease, stage IV (severe)     Diabetes     End stage renal disease on dialysis     MWF    GERD (gastroesophageal reflux disease)     HLD (hyperlipidemia)     HTN (hypertension)     Hyperkalemia     Leaky heart valve     Left knee pain     Lymphedema     Morbid obesity     Obesity     STAN (obstructive sleep apnea)     on CPAP    Personal history of fall     Pneumonia 2023    Shortness of breath     Sinusitis     SOB (shortness of breath)     Use of cane as ambulatory aid      Past Surgical History:   Procedure Laterality Date    AV FISTULA PLACEMENT Left 9/4/2024    Procedure: CREATION, AV FISTULA;  Surgeon: Mesfin Dasilva MD;  Location: [facility];  Service: Peripheral Vascular;  Laterality: Left;    COLONOSCOPY      CORONARY STENT PLACEMENT      DISC REMOVAL      C5 and C7    ESOPHAGOGASTRODUODENOSCOPY      FEMORAL ARTERY STENT  2013    GASTRIC BYPASS      INSERTION OF DIALYSIS CATHETER      PERCUTANEOUS CORONARY ARTERY INTERVENTION      REVISION OF ARTERIOVENOUS FISTULA Left 11/4/2024    Procedure: REVISION, AV FISTULA;  Surgeon: Mesfin Dasilva MD;  Location: [facility] OR;  Service: Peripheral Vascular;  Laterality: Left;    REVISION OF KNEE ARTHROPLASTY Left     SINUS SURGERY      TOTAL KNEE ARTHROPLASTY Left       Family History   Problem Relation Name Age of Onset    Hypertension Mother      Diabetes Mother      Hypertension Father      Diabetes Father       Social History     Socioeconomic History    Marital status: Single   Tobacco Use    Smoking status: Former     Current packs/day: 0.00     Types: Cigarettes     Quit date: 2009     Years since quitting: 15.9    Smokeless tobacco: Former     Types: Chew     Quit date: 03/2009   Substance and Sexual Activity    Alcohol use: Never    Drug use: Never     Current Outpatient Medications   Medication Instructions    acyclovir (ZOVIRAX) 400 mg, Oral, Daily    aspirin 81 MG Chew 1 tablet, Oral, Daily    atorvastatin (LIPITOR) 40 mg, Oral, Daily    calcitRIOL (ROCALTROL) 0.5 mcg, Oral, Daily    carvediloL (COREG) 25 mg, Oral, 2 times daily    doxazosin (CARDURA) 4 mg, Oral, Every 12 hours    fluticasone propionate (FLONASE) 50 mcg/actuation nasal spray 1 spray, Nasal, 2 times daily PRN    furosemide (LASIX) 40 mg, Oral, 2 times daily    gabapentin (NEURONTIN) 300 MG capsule 1 capsule, Oral, 3 times daily PRN    hydrALAZINE (APRESOLINE) 50 mg, Oral, 2 times daily    HYDROcodone-acetaminophen (NORCO)  mg per tablet 1 tablet, Oral, Every 8 hours PRN    methoxy peg-epoetin beta (MIRCERA INJ) 60 mcg, Prn at dialysis     Review of patient's allergies indicates:   Allergen Reactions    Nifedipine Swelling    Verapamil Other (See Comments)     Swelling of knee       Patient Care Team:  Thor London MD (Inactive) as PCP - General (Family Medicine)  Keily Valle MD (Nephrology)  Earle Orlando MD as Consulting Physician (Cardiothoracic Surgery)  Barrie Drew MD (Cardiology)  [facility] (Dialysis Center)  Mesfin Dasilva MD as Vascular Surgery (Vascular Surgery)      No follow-ups on file. In addition to their scheduled follow up, the patient has also been instructed to follow up on as needed basis.     Future Appointments   Date Time Provider Department  [place]   12/3/2024 12:50 PM Mesfin Dasilva MD [facility]

## 2024-12-03 ENCOUNTER — OFFICE VISIT (OUTPATIENT)
Dept: VASCULAR SURGERY | Facility: CLINIC | Age: 69
End: 2024-12-03
Payer: MEDICARE

## 2024-12-03 VITALS
HEIGHT: 67 IN | HEART RATE: 70 BPM | BODY MASS INDEX: 41.44 KG/M2 | WEIGHT: 264 LBS | DIASTOLIC BLOOD PRESSURE: 63 MMHG | SYSTOLIC BLOOD PRESSURE: 144 MMHG

## 2024-12-03 DIAGNOSIS — N18.6 END STAGE RENAL DISEASE: ICD-10-CM

## 2024-12-03 DIAGNOSIS — T82.590A MECHANICAL COMPLICATION OF ARTERIOVENOUS FISTULA SURGICALLY CREATED, INITIAL ENCOUNTER: Primary | ICD-10-CM

## 2025-01-27 ENCOUNTER — TELEPHONE (OUTPATIENT)
Dept: CARDIOLOGY | Facility: HOSPITAL | Age: 70
End: 2025-01-27
Payer: MEDICARE

## 2025-01-27 DIAGNOSIS — N18.6 ESRD (END STAGE RENAL DISEASE): ICD-10-CM

## 2025-01-27 DIAGNOSIS — M79.642 LEFT HAND PAIN: Primary | ICD-10-CM

## 2025-01-27 NOTE — TELEPHONE ENCOUNTER
HD center called the office today to report decreased access flows and severe left hand pain and cramping during treatment. He is s/p revision of left brachiocephalic fistula with Dr. Dasilva in November. Access flows are decreasing but remain at 709. Clearance is 1.66. HD nurse reports that the patient is nearly in tears from the pain in his hand. She also felt his hand and it was cool to touch while on the machine.     Spoke with clinic nurse who agrees with clinic visit ASAP to evaluate for steal syndrome.     Spoke with patient's wife and explained plan of care. She also reports that post treatment bleeding has been an issue as well. She reports he had his catheter removed last week at a local facility. She reports his symptoms were present prior to catheter removal. Advised her that clinic would give her a call with appointment date and time.     -ZT

## 2025-01-27 NOTE — PROGRESS NOTES
Reviewed symptoms reported by Iza Jacbo RN (see telephone encounter 1/27/25) with Dr. Dasilva regarding worsening left hand symptoms. Arterial duplex with PPGs ordered. Will have him follow up in clinic with testing and evaluation of hand symptoms.

## 2025-01-30 NOTE — PROGRESS NOTES
"      [facility] Vascular - Clinic Note  Mesfin Dasilva MD      Patient Name: Rodrigo Tinsley Jr.                   : 1955     MRN: [id number]   Visit Date: 2025          History Present Illness     Reason for Visit:  left hand symptoms    Mr. Tinsley presents to the clinic for evaluation of worsening left arm symptoms. He has a left brachiocephalic fistula. He was having previous intermittent cramping to his left hand. During dialysis his hand feels freezing cold. He has tried to warm his hand with a  glove. On a normal day, he states he does OK without much discomfort. He denies right hand issues. Most recent clearance is 1.66. His dialysis unit reports decreasing access flow (1129 earlier this month with recent 709).  He notes some increased bleeding post cannulation. His tunneled catheter was removed 2 weeks ago.  He has been having some issues with hypotension (88/43 was a recent low reading). He decreased doxazosin recently but did not notice changes. He states they are still making adjustments to those medications.       REVIEW OF SYSTEMS:  Review of systems conducted, negative except as stated in the history of present illness. See HPI for details.        Physical Exam      Vitals:    25 1247   BP: (!) 143/55   BP Location: Right arm   Patient Position: Sitting   Pulse: 65   Weight: 120.2 kg (265 lb)   Height: 5' 7" (1.702 m)        General: well-nourished, no acute distress, and healthy appearing, ambulating with a cane, alert, pleasant, conversant, and oriented  Neck/Chest: normal  and soft without lymphadenopathy  Respiratory: breathing easily and without respiratory distress  Abdomen: normal and soft  Cardiology: regular rate and rhythm    Upper Extremity Arterial Exam:   Left - radial is lightly palpable    Dialysis Access:  left brachiocephalic fistula  Assessment: good thrill, without pulsatility    Musculoskeletal:   Upper Extremity: weakness to left hand making fist, wide scar over " revision area  Lower Extremity: no edema present to bilateral lower extremities          Assessment and Plan     Mr. Tinsley is a 69 y.o. with end stage renal failure who is having worsening pain to his left hand. He has a left brachiocephalic fistula. He is s/p revision on 11/4/24. He reports worsening hand cramping and coolness during treatment.     Arterial duplex reveals normal velocities through his left arm arteries with diminished PPG waveforms which is consistent steal syndrome. I would strongly recommend fistula ligation to prevent further neurologic impact to the left upper extremity. He is attempting to make adjustments to his blood pressure medications. He would like to monitor his symptoms with these changes prior to making the decision to shutdown the fistula.   We can ligate the access if his symptoms don't improve and he desires to proceed.        1. Steal syndrome as complication of dialysis access, initial encounter    2. ESRD (end stage renal disease)           Imaging Obtained/Reviewed   Study: left upper extremity arterial duplex and photoplethysmograph (PPGs)  Date:   2/4/25           Medical History     Past Medical History:   Diagnosis Date    Anemia     Arthritis     Asymptomatic PVD (peripheral vascular disease)     stenting of BLE - Dr. Drew    Balance problem     CAD (coronary artery disease)     Chronic kidney disease, stage IV (severe)     Diabetes     End stage renal disease on dialysis     MWF    GERD (gastroesophageal reflux disease)     HLD (hyperlipidemia)     HTN (hypertension)     Hyperkalemia     Leaky heart valve     Left knee pain     Lymphedema     Morbid obesity     Obesity     STAN (obstructive sleep apnea)     on CPAP    Personal history of fall     Pneumonia 2023    Shortness of breath     Sinusitis     SOB (shortness of breath)     Use of cane as ambulatory aid      Past Surgical History:   Procedure Laterality Date    AV FISTULA PLACEMENT Left 9/4/2024    Procedure:  CREATION, AV FISTULA;  Surgeon: Mesfin Dasilva MD;  Location: [facility] OR;  Service: Peripheral Vascular;  Laterality: Left;    COLONOSCOPY      CORONARY STENT PLACEMENT      DISC REMOVAL      C5 and C7    ESOPHAGOGASTRODUODENOSCOPY      FEMORAL ARTERY STENT  2013    GASTRIC BYPASS      INSERTION OF DIALYSIS CATHETER      PERCUTANEOUS CORONARY ARTERY INTERVENTION      REVISION OF ARTERIOVENOUS FISTULA Left 2024    Procedure: REVISION, AV FISTULA;  Surgeon: Mesfin Dasilva MD;  Location: [facility] OR;  Service: Peripheral Vascular;  Laterality: Left;    REVISION OF KNEE ARTHROPLASTY Left     SINUS SURGERY      TOTAL KNEE ARTHROPLASTY Left      Family History   Problem Relation Name Age of Onset    Hypertension Mother      Diabetes Mother      Hypertension Father      Diabetes Father       Social History     Socioeconomic History    Marital status: Single   Tobacco Use    Smoking status: Former     Current packs/day: 0.00     Types: Cigarettes     Quit date:      Years since quittin.1    Smokeless tobacco: Former     Types: Chew     Quit date: 2009   Substance and Sexual Activity    Alcohol use: Never    Drug use: Never     Current Outpatient Medications   Medication Instructions    acyclovir (ZOVIRAX) 400 mg, Oral, Daily    aspirin 81 MG Chew 1 tablet, Oral, Daily    atorvastatin (LIPITOR) 40 mg, Oral, Daily    calcitRIOL (ROCALTROL) 0.5 mcg, Oral, Daily    carvediloL (COREG) 25 mg, Oral, 2 times daily    doxazosin (CARDURA) 4 mg, Oral, Every 12 hours    fluticasone propionate (FLONASE) 50 mcg/actuation nasal spray 1 spray, Nasal, 2 times daily PRN    furosemide (LASIX) 40 mg, Oral, 2 times daily    gabapentin (NEURONTIN) 300 MG capsule 1 capsule, Oral, 3 times daily PRN    hydrALAZINE (APRESOLINE) 50 mg, Oral, 2 times daily    HYDROcodone-acetaminophen (NORCO)  mg per tablet 1 tablet, Oral, Every 8 hours PRN    methoxy peg-epoetin beta (MIRCERA INJ) 60 mcg, Prn at dialysis     Review of  patient's allergies indicates:   Allergen Reactions    Nifedipine Swelling    Verapamil Other (See Comments)     Swelling of knee       Patient Care Team:  Thor London MD (Inactive) as PCP - General (Family Medicine)  Keily Valle MD (Nephrology)  Earle Orlando MD as Consulting Physician (Cardiothoracic Surgery)  Barrie Drew MD (Cardiology)  [facility] (Dialysis Center)  Mesfin Dasilva MD as Vascular Surgery (Vascular Surgery)      No follow-ups on file. In addition to their scheduled follow up, the patient has also been instructed to follow up on as needed basis.     No future appointments.

## 2025-02-04 ENCOUNTER — OFFICE VISIT (OUTPATIENT)
Dept: VASCULAR SURGERY | Facility: CLINIC | Age: 70
End: 2025-02-04
Attending: SPECIALIST
Payer: MEDICARE

## 2025-02-04 VITALS
WEIGHT: 265 LBS | DIASTOLIC BLOOD PRESSURE: 55 MMHG | BODY MASS INDEX: 41.59 KG/M2 | HEIGHT: 67 IN | HEART RATE: 65 BPM | SYSTOLIC BLOOD PRESSURE: 143 MMHG

## 2025-02-04 DIAGNOSIS — N18.6 ESRD (END STAGE RENAL DISEASE): ICD-10-CM

## 2025-02-04 DIAGNOSIS — T82.898A STEAL SYNDROME AS COMPLICATION OF DIALYSIS ACCESS, INITIAL ENCOUNTER: Primary | ICD-10-CM

## 2025-02-04 PROCEDURE — 99213 OFFICE O/P EST LOW 20 MIN: CPT | Mod: ,,, | Performed by: SPECIALIST

## (undated) DEVICE — Device

## (undated) DEVICE — SYR 10CC LUER LOCK

## (undated) DEVICE — SUT PROLENE 6-0 BV-1 30IN

## (undated) DEVICE — COVER PROBE US 5.5X58L NON LTX

## (undated) DEVICE — SUT PROLENE 6-0 24 BV-1

## (undated) DEVICE — GLOVE PROTEXIS HYDROGEL SZ6.5

## (undated) DEVICE — BAG MEDI-PLAST DECANTER C-FLOW

## (undated) DEVICE — APPLICATOR CHLORAPREP ORN 26ML

## (undated) DEVICE — ELECTRODE PATIENT RETURN DISP

## (undated) DEVICE — NDL HYPO REG 25G X 1 1/2

## (undated) DEVICE — LOOP STERION MINI RED 8X406MM

## (undated) DEVICE — SUT MCRYL PLUS 4-0 PS2 27IN

## (undated) DEVICE — ADHESIVE DERMABOND ADVANCED

## (undated) DEVICE — SPONGE LAP 18X18 PREWASHED

## (undated) DEVICE — SYR 30CC LUER LOCK

## (undated) DEVICE — KIT SURGICAL TURNOVER

## (undated) DEVICE — DRAPE INCISE IOBAN 2 23X23IN

## (undated) DEVICE — SUT VICRYL 3-0 27 SH

## (undated) DEVICE — SPONGE GAUZE 16PLY 4X4

## (undated) DEVICE — SOL NORMAL USPCA 0.9%

## (undated) DEVICE — CUP PROFEX GLASS GRADUATE 1OZ

## (undated) DEVICE — SUT 3-0 12-18IN SILK

## (undated) DEVICE — PAD PREP CUFFED NS 24X48IN

## (undated) DEVICE — GLOVE PROTEXIS LTX MICRO 6.5

## (undated) DEVICE — BOOT SUTURE AID

## (undated) DEVICE — SUT 2-0 12-18IN SILK

## (undated) DEVICE — BOWL STERILE LARGE 32OZ

## (undated) DEVICE — DRAPE INCISE IOBAN 2 13X13IN

## (undated) DEVICE — SUT 4-0 12-18IN SILK BLACK

## (undated) DEVICE — CATH IV JELCO 24G X 3/4